# Patient Record
Sex: MALE | Race: BLACK OR AFRICAN AMERICAN | HISPANIC OR LATINO | Employment: FULL TIME | URBAN - METROPOLITAN AREA
[De-identification: names, ages, dates, MRNs, and addresses within clinical notes are randomized per-mention and may not be internally consistent; named-entity substitution may affect disease eponyms.]

---

## 2023-04-21 LAB
ALANINE AMINOTRANSFERASE (SGPT) (U/L) IN SER/PLAS: 14 U/L (ref 10–52)
ALBUMIN (G/DL) IN SER/PLAS: 4.2 G/DL (ref 3.4–5)
ALKALINE PHOSPHATASE (U/L) IN SER/PLAS: 73 U/L (ref 33–136)
ANION GAP IN SER/PLAS: 12 MMOL/L (ref 10–20)
ASPARTATE AMINOTRANSFERASE (SGOT) (U/L) IN SER/PLAS: 12 U/L (ref 9–39)
BASOPHILS (10*3/UL) IN BLOOD BY AUTOMATED COUNT: 0.03 X10E9/L (ref 0–0.1)
BASOPHILS/100 LEUKOCYTES IN BLOOD BY AUTOMATED COUNT: 0.7 % (ref 0–2)
BILIRUBIN TOTAL (MG/DL) IN SER/PLAS: 0.6 MG/DL (ref 0–1.2)
CALCIUM (MG/DL) IN SER/PLAS: 10.1 MG/DL (ref 8.6–10.6)
CARBON DIOXIDE, TOTAL (MMOL/L) IN SER/PLAS: 28 MMOL/L (ref 21–32)
CHLORIDE (MMOL/L) IN SER/PLAS: 105 MMOL/L (ref 98–107)
CHOLESTEROL (MG/DL) IN SER/PLAS: 164 MG/DL (ref 0–199)
CHOLESTEROL IN HDL (MG/DL) IN SER/PLAS: 71.9 MG/DL
CHOLESTEROL/HDL RATIO: 2.3
CREATININE (MG/DL) IN SER/PLAS: 0.91 MG/DL (ref 0.5–1.3)
EOSINOPHILS (10*3/UL) IN BLOOD BY AUTOMATED COUNT: 0.2 X10E9/L (ref 0–0.4)
EOSINOPHILS/100 LEUKOCYTES IN BLOOD BY AUTOMATED COUNT: 4.7 % (ref 0–6)
ERYTHROCYTE DISTRIBUTION WIDTH (RATIO) BY AUTOMATED COUNT: 14.8 % (ref 11.5–14.5)
ERYTHROCYTE MEAN CORPUSCULAR HEMOGLOBIN CONCENTRATION (G/DL) BY AUTOMATED: 31.6 G/DL (ref 32–36)
ERYTHROCYTE MEAN CORPUSCULAR VOLUME (FL) BY AUTOMATED COUNT: 89 FL (ref 80–100)
ERYTHROCYTES (10*6/UL) IN BLOOD BY AUTOMATED COUNT: 4.52 X10E12/L (ref 4.5–5.9)
GFR MALE: 88 ML/MIN/1.73M2
GLUCOSE (MG/DL) IN SER/PLAS: 93 MG/DL (ref 74–99)
HEMATOCRIT (%) IN BLOOD BY AUTOMATED COUNT: 40.2 % (ref 41–52)
HEMOGLOBIN (G/DL) IN BLOOD: 12.7 G/DL (ref 13.5–17.5)
IMMATURE GRANULOCYTES/100 LEUKOCYTES IN BLOOD BY AUTOMATED COUNT: 0.2 % (ref 0–0.9)
LDL: 82 MG/DL (ref 0–99)
LEUKOCYTES (10*3/UL) IN BLOOD BY AUTOMATED COUNT: 4.3 X10E9/L (ref 4.4–11.3)
LYMPHOCYTES (10*3/UL) IN BLOOD BY AUTOMATED COUNT: 1.24 X10E9/L (ref 0.8–3)
LYMPHOCYTES/100 LEUKOCYTES IN BLOOD BY AUTOMATED COUNT: 28.8 % (ref 13–44)
MONOCYTES (10*3/UL) IN BLOOD BY AUTOMATED COUNT: 0.37 X10E9/L (ref 0.05–0.8)
MONOCYTES/100 LEUKOCYTES IN BLOOD BY AUTOMATED COUNT: 8.6 % (ref 2–10)
NEUTROPHILS (10*3/UL) IN BLOOD BY AUTOMATED COUNT: 2.45 X10E9/L (ref 1.6–5.5)
NEUTROPHILS/100 LEUKOCYTES IN BLOOD BY AUTOMATED COUNT: 57 % (ref 40–80)
NRBC (PER 100 WBCS) BY AUTOMATED COUNT: 0 /100 WBC (ref 0–0)
PLATELETS (10*3/UL) IN BLOOD AUTOMATED COUNT: 298 X10E9/L (ref 150–450)
POTASSIUM (MMOL/L) IN SER/PLAS: 4.5 MMOL/L (ref 3.5–5.3)
PROTEIN TOTAL: 6.9 G/DL (ref 6.4–8.2)
SODIUM (MMOL/L) IN SER/PLAS: 140 MMOL/L (ref 136–145)
THYROTROPIN (MIU/L) IN SER/PLAS BY DETECTION LIMIT <= 0.05 MIU/L: 0.84 MIU/L (ref 0.44–3.98)
THYROXINE (T4) FREE (NG/DL) IN SER/PLAS: 1.29 NG/DL (ref 0.78–1.48)
TRIGLYCERIDE (MG/DL) IN SER/PLAS: 49 MG/DL (ref 0–149)
UREA NITROGEN (MG/DL) IN SER/PLAS: 18 MG/DL (ref 6–23)
VLDL: 10 MG/DL (ref 0–40)

## 2023-07-25 LAB
APPEARANCE, URINE: CLEAR
BILIRUBIN, URINE: NEGATIVE
BLOOD, URINE: NEGATIVE
COLOR, URINE: YELLOW
GLUCOSE, URINE: NEGATIVE MG/DL
KETONES, URINE: NEGATIVE MG/DL
LEUKOCYTE ESTERASE, URINE: NEGATIVE
NITRITE, URINE: NEGATIVE
PH, URINE: 5 (ref 5–8)
PROTEIN, URINE: NEGATIVE MG/DL
SPECIFIC GRAVITY, URINE: 1.02 (ref 1–1.03)
UROBILINOGEN, URINE: <2 MG/DL (ref 0–1.9)

## 2023-07-26 LAB — URINE CULTURE: NO GROWTH

## 2023-10-04 DIAGNOSIS — E78.00 ELEVATED LDL CHOLESTEROL LEVEL: ICD-10-CM

## 2023-10-04 DIAGNOSIS — M10.9 GOUT, ARTHROPATHY: Primary | ICD-10-CM

## 2023-10-04 PROBLEM — M17.12 PRIMARY OSTEOARTHRITIS OF LEFT KNEE: Status: ACTIVE | Noted: 2023-10-04

## 2023-10-04 PROBLEM — M51.16 LUMBAR DISC DISEASE WITH RADICULOPATHY: Status: ACTIVE | Noted: 2023-10-04

## 2023-10-04 PROBLEM — M51.369 DEGENERATIVE DISC DISEASE, LUMBAR: Status: ACTIVE | Noted: 2023-10-04

## 2023-10-04 PROBLEM — N62 GYNECOMASTIA, MALE: Status: ACTIVE | Noted: 2023-10-04

## 2023-10-04 PROBLEM — I10 BENIGN ESSENTIAL HYPERTENSION: Status: ACTIVE | Noted: 2023-10-04

## 2023-10-04 PROBLEM — E78.5 HYPERLIPIDEMIA: Status: ACTIVE | Noted: 2023-10-04

## 2023-10-04 PROBLEM — M51.36 DEGENERATIVE DISC DISEASE, LUMBAR: Status: ACTIVE | Noted: 2023-10-04

## 2023-10-04 PROBLEM — M25.462 EFFUSION, LEFT KNEE: Status: ACTIVE | Noted: 2023-10-04

## 2023-10-04 PROBLEM — E04.9 THYROID GOITER: Status: ACTIVE | Noted: 2023-10-04

## 2023-10-04 PROBLEM — M1A.0620 CHRONIC GOUT OF LEFT KNEE: Status: ACTIVE | Noted: 2023-10-04

## 2023-10-04 PROBLEM — E55.9 VITAMIN D DEFICIENCY: Status: ACTIVE | Noted: 2023-10-04

## 2023-10-04 PROBLEM — R00.1 BRADYCARDIA: Status: ACTIVE | Noted: 2023-10-04

## 2023-10-04 RX ORDER — FLUTICASONE PROPIONATE 50 MCG
SPRAY, SUSPENSION (ML) NASAL
COMMUNITY
Start: 2023-03-01

## 2023-10-04 RX ORDER — ATORVASTATIN CALCIUM 10 MG/1
10 TABLET, FILM COATED ORAL DAILY
Qty: 10 TABLET | Refills: 0 | Status: SHIPPED | OUTPATIENT
Start: 2023-10-04 | End: 2023-10-05 | Stop reason: SDUPTHER

## 2023-10-04 RX ORDER — ALLOPURINOL 100 MG/1
100 TABLET ORAL DAILY
Qty: 10 TABLET | Refills: 0 | Status: SHIPPED | OUTPATIENT
Start: 2023-10-04 | End: 2023-10-05 | Stop reason: SDUPTHER

## 2023-10-04 RX ORDER — ALLOPURINOL 100 MG/1
1 TABLET ORAL DAILY
COMMUNITY
Start: 2014-01-22 | End: 2023-10-04 | Stop reason: SDUPTHER

## 2023-10-04 RX ORDER — BETAMETHASONE DIPROPIONATE 0.5 MG/G
CREAM TOPICAL
COMMUNITY
Start: 2021-09-15

## 2023-10-04 RX ORDER — ATORVASTATIN CALCIUM 10 MG/1
1 TABLET, FILM COATED ORAL DAILY
COMMUNITY
Start: 2019-08-12 | End: 2023-10-04 | Stop reason: SDUPTHER

## 2023-10-05 ENCOUNTER — TELEPHONE (OUTPATIENT)
Dept: PRIMARY CARE | Facility: CLINIC | Age: 73
End: 2023-10-05
Payer: MEDICARE

## 2023-10-05 DIAGNOSIS — E78.00 ELEVATED LDL CHOLESTEROL LEVEL: ICD-10-CM

## 2023-10-05 DIAGNOSIS — M10.9 GOUT, ARTHROPATHY: ICD-10-CM

## 2023-10-05 RX ORDER — ATORVASTATIN CALCIUM 10 MG/1
10 TABLET, FILM COATED ORAL DAILY
Qty: 10 TABLET | Refills: 0 | Status: SHIPPED | OUTPATIENT
Start: 2023-10-05 | End: 2023-10-16

## 2023-10-05 RX ORDER — ALLOPURINOL 100 MG/1
100 TABLET ORAL DAILY
Qty: 10 TABLET | Refills: 0 | Status: SHIPPED | OUTPATIENT
Start: 2023-10-05 | End: 2023-10-20

## 2023-10-14 DIAGNOSIS — E78.00 ELEVATED LDL CHOLESTEROL LEVEL: ICD-10-CM

## 2023-10-16 RX ORDER — ATORVASTATIN CALCIUM 10 MG/1
10 TABLET, FILM COATED ORAL DAILY
Qty: 90 TABLET | Refills: 1 | Status: SHIPPED | OUTPATIENT
Start: 2023-10-16 | End: 2024-04-03 | Stop reason: SDUPTHER

## 2023-10-20 DIAGNOSIS — M10.9 GOUT, ARTHROPATHY: ICD-10-CM

## 2023-10-20 RX ORDER — ALLOPURINOL 100 MG/1
100 TABLET ORAL DAILY
Qty: 90 TABLET | Refills: 0 | Status: SHIPPED | OUTPATIENT
Start: 2023-10-20 | End: 2024-01-15

## 2023-10-23 PROBLEM — M54.16 LUMBAR BACK PAIN WITH RADICULOPATHY AFFECTING RIGHT LOWER EXTREMITY: Status: ACTIVE | Noted: 2023-10-23

## 2023-10-23 PROBLEM — M76.892 LEFT KNEE TENDONITIS: Status: ACTIVE | Noted: 2023-10-23

## 2023-10-23 PROBLEM — N63.0 MASS OF BREAST: Status: ACTIVE | Noted: 2023-10-23

## 2023-10-23 PROBLEM — L72.9 SKIN CYST: Status: ACTIVE | Noted: 2023-10-23

## 2023-10-23 PROBLEM — N31.9 NEUROGENIC BLADDER: Status: ACTIVE | Noted: 2023-10-23

## 2023-10-23 PROBLEM — M67.441 GANGLION CYST OF TENDON SHEATH OF RIGHT HAND: Status: ACTIVE | Noted: 2023-10-23

## 2023-10-23 PROBLEM — E04.2 MULTIPLE THYROID NODULES: Status: ACTIVE | Noted: 2023-10-23

## 2023-10-23 PROBLEM — L53.9 FOOT ERYTHEMA: Status: ACTIVE | Noted: 2023-10-23

## 2023-10-23 PROBLEM — C61: Status: ACTIVE | Noted: 2023-10-23

## 2023-10-23 PROBLEM — R31.9 HEMATURIA: Status: ACTIVE | Noted: 2023-10-23

## 2023-10-23 PROBLEM — A49.8 PROTEUS INFECTION: Status: ACTIVE | Noted: 2023-10-23

## 2023-10-23 PROBLEM — R06.83 SNORING: Status: ACTIVE | Noted: 2023-10-23

## 2023-10-23 PROBLEM — R00.1 PERSISTENT SINUS BRADYCARDIA: Status: ACTIVE | Noted: 2023-10-23

## 2023-10-23 RX ORDER — MUPIROCIN 20 MG/G
1 OINTMENT TOPICAL 3 TIMES DAILY
COMMUNITY
Start: 2022-12-15 | End: 2023-10-25 | Stop reason: ALTCHOICE

## 2023-10-23 RX ORDER — VALACYCLOVIR HYDROCHLORIDE 500 MG/1
500 TABLET, FILM COATED ORAL 2 TIMES DAILY
COMMUNITY

## 2023-10-25 ENCOUNTER — OFFICE VISIT (OUTPATIENT)
Dept: PRIMARY CARE | Facility: CLINIC | Age: 73
End: 2023-10-25
Payer: MEDICARE

## 2023-10-25 ENCOUNTER — LAB (OUTPATIENT)
Dept: LAB | Facility: LAB | Age: 73
End: 2023-10-25
Payer: MEDICARE

## 2023-10-25 VITALS
HEART RATE: 72 BPM | SYSTOLIC BLOOD PRESSURE: 130 MMHG | RESPIRATION RATE: 16 BRPM | BODY MASS INDEX: 28.08 KG/M2 | HEIGHT: 69 IN | WEIGHT: 189.6 LBS | DIASTOLIC BLOOD PRESSURE: 78 MMHG

## 2023-10-25 DIAGNOSIS — M1A.0620 IDIOPATHIC CHRONIC GOUT OF LEFT KNEE WITHOUT TOPHUS: ICD-10-CM

## 2023-10-25 DIAGNOSIS — M51.16 LUMBAR DISC DISEASE WITH RADICULOPATHY: ICD-10-CM

## 2023-10-25 DIAGNOSIS — M51.36 DEGENERATIVE DISC DISEASE, LUMBAR: ICD-10-CM

## 2023-10-25 DIAGNOSIS — I10 BENIGN ESSENTIAL HYPERTENSION: ICD-10-CM

## 2023-10-25 DIAGNOSIS — C61 PROSTATIC ADENOCARCINOMA (MULTI): ICD-10-CM

## 2023-10-25 DIAGNOSIS — Z00.00 MEDICARE ANNUAL WELLNESS VISIT, SUBSEQUENT: Primary | ICD-10-CM

## 2023-10-25 DIAGNOSIS — E78.00 ELEVATED LDL CHOLESTEROL LEVEL: ICD-10-CM

## 2023-10-25 DIAGNOSIS — E04.2 MULTIPLE THYROID NODULES: ICD-10-CM

## 2023-10-25 LAB
ALBUMIN SERPL BCP-MCNC: 4.5 G/DL (ref 3.4–5)
ALP SERPL-CCNC: 68 U/L (ref 33–136)
ALT SERPL W P-5'-P-CCNC: 19 U/L (ref 10–52)
ANION GAP SERPL CALC-SCNC: 11 MMOL/L (ref 10–20)
AST SERPL W P-5'-P-CCNC: 16 U/L (ref 9–39)
BASOPHILS # BLD AUTO: 0.02 X10*3/UL (ref 0–0.1)
BASOPHILS NFR BLD AUTO: 0.5 %
BILIRUB SERPL-MCNC: 0.8 MG/DL (ref 0–1.2)
BUN SERPL-MCNC: 18 MG/DL (ref 6–23)
CALCIUM SERPL-MCNC: 10.5 MG/DL (ref 8.6–10.6)
CHLORIDE SERPL-SCNC: 104 MMOL/L (ref 98–107)
CHOLEST SERPL-MCNC: 189 MG/DL (ref 0–199)
CHOLESTEROL/HDL RATIO: 2.8
CO2 SERPL-SCNC: 29 MMOL/L (ref 21–32)
CREAT SERPL-MCNC: 0.89 MG/DL (ref 0.5–1.3)
EOSINOPHIL # BLD AUTO: 0.27 X10*3/UL (ref 0–0.4)
EOSINOPHIL NFR BLD AUTO: 6.1 %
ERYTHROCYTE [DISTWIDTH] IN BLOOD BY AUTOMATED COUNT: 15.1 % (ref 11.5–14.5)
GFR SERPL CREATININE-BSD FRML MDRD: 90 ML/MIN/1.73M*2
GLUCOSE SERPL-MCNC: 96 MG/DL (ref 74–99)
HCT VFR BLD AUTO: 44.3 % (ref 41–52)
HDLC SERPL-MCNC: 67.6 MG/DL
HGB BLD-MCNC: 14.1 G/DL (ref 13.5–17.5)
IMM GRANULOCYTES # BLD AUTO: 0.01 X10*3/UL (ref 0–0.5)
IMM GRANULOCYTES NFR BLD AUTO: 0.2 % (ref 0–0.9)
LDLC SERPL CALC-MCNC: 106 MG/DL
LYMPHOCYTES # BLD AUTO: 1.6 X10*3/UL (ref 0.8–3)
LYMPHOCYTES NFR BLD AUTO: 36.1 %
MCH RBC QN AUTO: 28.6 PG (ref 26–34)
MCHC RBC AUTO-ENTMCNC: 31.8 G/DL (ref 32–36)
MCV RBC AUTO: 90 FL (ref 80–100)
MONOCYTES # BLD AUTO: 0.41 X10*3/UL (ref 0.05–0.8)
MONOCYTES NFR BLD AUTO: 9.3 %
NEUTROPHILS # BLD AUTO: 2.12 X10*3/UL (ref 1.6–5.5)
NEUTROPHILS NFR BLD AUTO: 47.8 %
NON HDL CHOLESTEROL: 121 MG/DL (ref 0–149)
NRBC BLD-RTO: 0 /100 WBCS (ref 0–0)
PLATELET # BLD AUTO: 298 X10*3/UL (ref 150–450)
PMV BLD AUTO: 10.4 FL (ref 7.5–11.5)
POTASSIUM SERPL-SCNC: 4.1 MMOL/L (ref 3.5–5.3)
PROT SERPL-MCNC: 7.7 G/DL (ref 6.4–8.2)
PSA SERPL-MCNC: <0.1 NG/ML
RBC # BLD AUTO: 4.93 X10*6/UL (ref 4.5–5.9)
SODIUM SERPL-SCNC: 140 MMOL/L (ref 136–145)
TRIGL SERPL-MCNC: 79 MG/DL (ref 0–149)
TSH SERPL-ACNC: 1.02 MIU/L (ref 0.44–3.98)
VLDL: 16 MG/DL (ref 0–40)
WBC # BLD AUTO: 4.4 X10*3/UL (ref 4.4–11.3)

## 2023-10-25 PROCEDURE — 99214 OFFICE O/P EST MOD 30 MIN: CPT | Performed by: INTERNAL MEDICINE

## 2023-10-25 PROCEDURE — 3078F DIAST BP <80 MM HG: CPT | Performed by: INTERNAL MEDICINE

## 2023-10-25 PROCEDURE — 84443 ASSAY THYROID STIM HORMONE: CPT

## 2023-10-25 PROCEDURE — 85025 COMPLETE CBC W/AUTO DIFF WBC: CPT

## 2023-10-25 PROCEDURE — 36415 COLL VENOUS BLD VENIPUNCTURE: CPT

## 2023-10-25 PROCEDURE — 1159F MED LIST DOCD IN RCRD: CPT | Performed by: INTERNAL MEDICINE

## 2023-10-25 PROCEDURE — 1125F AMNT PAIN NOTED PAIN PRSNT: CPT | Performed by: INTERNAL MEDICINE

## 2023-10-25 PROCEDURE — G0439 PPPS, SUBSEQ VISIT: HCPCS | Performed by: INTERNAL MEDICINE

## 2023-10-25 PROCEDURE — 80053 COMPREHEN METABOLIC PANEL: CPT

## 2023-10-25 PROCEDURE — 3075F SYST BP GE 130 - 139MM HG: CPT | Performed by: INTERNAL MEDICINE

## 2023-10-25 PROCEDURE — 84153 ASSAY OF PSA TOTAL: CPT

## 2023-10-25 PROCEDURE — 1036F TOBACCO NON-USER: CPT | Performed by: INTERNAL MEDICINE

## 2023-10-25 PROCEDURE — 80061 LIPID PANEL: CPT

## 2023-10-25 RX ORDER — BICALUTAMIDE 50 MG/1
TABLET, FILM COATED ORAL
COMMUNITY
Start: 2020-01-08

## 2023-10-25 ASSESSMENT — ENCOUNTER SYMPTOMS
HEMATOLOGIC/LYMPHATIC NEGATIVE: 1
LOSS OF SENSATION IN FEET: 0
ENDOCRINE NEGATIVE: 1
ALLERGIC/IMMUNOLOGIC NEGATIVE: 1
EYES NEGATIVE: 1
NEUROLOGICAL NEGATIVE: 1
MUSCULOSKELETAL NEGATIVE: 1
DEPRESSION: 0
CONSTITUTIONAL NEGATIVE: 1
PSYCHIATRIC NEGATIVE: 1
GASTROINTESTINAL NEGATIVE: 1
OCCASIONAL FEELINGS OF UNSTEADINESS: 0
CARDIOVASCULAR NEGATIVE: 1
RESPIRATORY NEGATIVE: 1

## 2023-10-25 NOTE — ASSESSMENT & PLAN NOTE
HTN - hypertension well/controlled .Target BP < 130/80  achieved. Educate low salt diet and exercise with weight loss. Educate home self monitoring and diary keeping. Educate risks of elevate blood pressure and benefits of prompt treatment.

## 2023-10-25 NOTE — ASSESSMENT & PLAN NOTE
Chronic  gout into the right knee joint  - with pain and swelling and redness - educate extensively diet and  start Colchicine 0.6 mg daily and Allopurinol 300 mg daily  -= and pain control with Tylenol; 325 mg TID  PRN for 10 days # 30  -

## 2023-10-25 NOTE — PROGRESS NOTES
"Subjective   Patient ID: Gee Hardin is a 73 y.o. male who presents for Medicare Annual Wellness Visit Initial (MEDICARE WELLNESS).    HPI     Review of Systems   Constitutional: Negative.    HENT: Negative.     Eyes: Negative.    Respiratory: Negative.     Cardiovascular: Negative.    Gastrointestinal: Negative.    Endocrine: Negative.    Musculoskeletal: Negative.    Skin: Negative.    Allergic/Immunologic: Negative.    Neurological: Negative.    Hematological: Negative.    Psychiatric/Behavioral: Negative.     All other systems reviewed and are negative.      Objective   Ht 1.753 m (5' 9\")   Wt 86 kg (189 lb 9.5 oz)   BMI 28.00 kg/m²   Height 1.753 m (5' 9\"), weight 86 kg (189 lb 9.5 oz).   Physical Exam  Vitals and nursing note reviewed.   Constitutional:       Appearance: Normal appearance.   HENT:      Head: Normocephalic and atraumatic.      Right Ear: Tympanic membrane, ear canal and external ear normal.      Left Ear: Tympanic membrane, ear canal and external ear normal. There is no impacted cerumen.      Nose: Nose normal.      Mouth/Throat:      Mouth: Mucous membranes are moist.      Pharynx: Oropharynx is clear.   Eyes:      Extraocular Movements: Extraocular movements intact.      Conjunctiva/sclera: Conjunctivae normal.      Pupils: Pupils are equal, round, and reactive to light.   Cardiovascular:      Rate and Rhythm: Normal rate and regular rhythm.      Pulses: Normal pulses.      Heart sounds: Normal heart sounds. No murmur heard.  Pulmonary:      Effort: Pulmonary effort is normal. No respiratory distress.      Breath sounds: Normal breath sounds. No stridor. No wheezing, rhonchi or rales.   Chest:      Chest wall: No tenderness.   Abdominal:      General: Abdomen is flat. Bowel sounds are normal. There is no distension.      Palpations: Abdomen is soft. There is no mass.      Tenderness: There is no abdominal tenderness. There is no right CVA tenderness, left CVA tenderness, guarding or " rebound.      Hernia: No hernia is present.   Musculoskeletal:         General: Normal range of motion.      Cervical back: Normal range of motion and neck supple.   Skin:     General: Skin is warm.      Capillary Refill: Capillary refill takes less than 2 seconds.   Neurological:      General: No focal deficit present.      Mental Status: He is alert.      Cranial Nerves: No cranial nerve deficit.      Sensory: No sensory deficit.      Motor: No weakness.      Coordination: Coordination normal.      Gait: Gait normal.      Deep Tendon Reflexes: Reflexes normal.   Psychiatric:         Mood and Affect: Mood normal.         Behavior: Behavior normal. Behavior is cooperative.         Thought Content: Thought content normal.         Judgment: Judgment normal.         Assessment/Plan   Problem List Items Addressed This Visit             ICD-10-CM    Benign essential hypertension I10     HTN - hypertension well/controlled .Target BP < 130/80  achieved. Educate low salt diet and exercise with weight loss. Educate home self monitoring and diary keeping. Educate risks of elevate blood pressure and benefits of prompt treatment.             Relevant Orders    Comprehensive Metabolic Panel    Degenerative disc disease, lumbar M51.36     DDD - Degenerative disc disease of the Lumbo-Sacral (LS)/Cervical (C)  spine. Educate exercises and referred patient to Physical Therapy (PT). Ordered X-Ray's of the LS/C spine. Consider MRI. Radiculopathy in the distribution of L4-L5-S1/C3-C4-C5 nerve roots, needs NSAIDS (Naprosin 500mg BID vs. Arthrotec 75mg BID or Prednisone taper (Medrol dose pack), Flexeril 10 mg qHS, heat application, and pain control with Tylenol vs. Vicodin 5/325 mg TID.            Chronic gout of left knee [id number].0620     Chronic  gout into the right knee joint  - with pain and swelling and redness - educate extensively diet and  start Colchicine 0.6 mg daily and Allopurinol 300 mg daily  -= and pain control with Tylenol;  325 mg TID  PRN for 10 days # 30  -           Elevated LDL cholesterol level E78.00     Hypercholesterolemia - Monitor lipid profile and educate patient upon risks of high cholesterol and targets. Educate diet and change in lifestyle and increase in exercises - Refill: Atorvastatin  and educate compliance with medication and diet.           Relevant Orders    Lipid Panel    Multiple thyroid nodules E04.2     .goiter and thyroid nodule and check US and function          Relevant Orders    CBC and Auto Differential    TSH with reflex to Free T4 if abnormal    Prostatic adenocarcinoma (CMS/HCC) C61     Monitor PSA and Asymptomatic   - and feels well and on Casodex         Relevant Orders    Prostate Specific Antigen    Medicare annual wellness visit, subsequent - Primary Z00.00     Medicare wellness-   No recent hospitalizations.    All medications reviewed and reconciled by me the provider..  No use of controlled substances or opiates.    Family history, social history reviewed, no changes.    Patient does not smoke.    Patient does not drink.    Patient hydrates adequately daily.  Eats a well-balanced healthy diet.     Exercises adequately including walking and doing weightbearing exercises.    Patient denies any difficulty with memory or cognition.     Denies any difficulty with hearing.  Patient does not wear hearing aids.    No fall risk.  No recent falls.  Denies any difficulty walking.    Patient with no history of depression anxiety, denies any loss of interest, no feeling of sadness, no lack of motivation.    Patient is independent in all ADLs and IADLs.  Independent bathing, dressing, walking.  Takes care of own finances, shopping and cooking.     End-of-life decision-making power of  reviewed with patient.     Preventive measures - Recommend ASAP : Refer to Gi for  Colonoscopy (educate patient risks of colon cancer) refer patient to GI specialist. Ophthalmology and retina exam recommend yearly exams  refer patient to an Ophthalmologist. Prostate cancer and followed with  Urologist for rectal exam and PSA check.     COVID-19, mRNA, LNP-S, PF, 100 mcg/0.5mL dose or 50 mcg/0.25mL dose (Moderna SARS-CoV-2 Vaccination)2/12/2022, 8/15/2021, 3/4/2021, 2/4/2021    COVID-19, mRNA, LNP-S, PF, 50 mcg/0.5 mL (Moderna COVID-19 vaccine, Fall 2023, 12 yeasrs and older (50mcg/0.5mL))9/30/2023    COVID-19, mRNA, LNP-S, bivalent, PF, 50 mcg/0.5 mL or 25mcg/0.25 mL dose (Moderna COVID-19 vaccine, bivalent, blue cap/gray label *Check age/dose*)9/18/2022    Influenza vaccine, quadrivalent, adjuvanted (Influenza, Seasonal, Quadrivalent, Adjuvanted)8/31/2023    Influenza, seasonal, injectable9/22/2016, 9/17/2015    Pneumococcal conjugate PCV 13 (Pneumococcal conjugate vaccine, 13-valent (PREVNAR 13))9/17/2015    Pneumococcal conjugate PCV20, polysaccharide [id number] conjugate, adjuvant, PF (Pneumococcal conjugate vaccine, 20-valent (PREVNAR 20))8/31/2023    pneumococcal polysaccharide PPV23 (Pneumococcal polysaccharide vaccine, 23-valent, age 2 years and older (PNEUMOVAX 23))5/9/2021    zoster live (Zoster, live)9/14/2011    zoster recombinant (Zoster vaccine, recombinant, adult (SHINGRIX))5/9/2021, 3/5/2019      Forecasted immunizations as of today at 8:57 AM     suggestion  The following recommendations are calculated by the immunization registry and might not match your organization's recommended schedule.     VaccineRecommended Due Date Next Dose #     TDAP (Tdap vaccine, age 7 year and older (BOOSTRIX))1/5/1957 1    HepB (Hep B, Unspecified)1/1/1986 1    HepA (Hep A, Unspecified)1/1/1996 1    FLU (Influenza, Unspecified)7/1/2024

## 2023-10-25 NOTE — ASSESSMENT & PLAN NOTE
DDD - Degenerative disc disease of the Lumbo-Sacral (LS)/Cervical (C)  spine. Educate exercises and referred patient to Physical Therapy (PT). Ordered X-Ray's of the LS/C spine. Consider MRI. Radiculopathy in the distribution of L4-L5-S1/C3-C4-C5 nerve roots, needs NSAIDS (Naprosin 500mg BID vs. Arthrotec 75mg BID or Prednisone taper (Medrol dose pack), Flexeril 10 mg qHS, heat application, and pain control with Tylenol vs. Vicodin 5/325 mg TID.

## 2023-10-25 NOTE — ASSESSMENT & PLAN NOTE
Medicare wellness-   No recent hospitalizations.    All medications reviewed and reconciled by me the provider..  No use of controlled substances or opiates.    Family history, social history reviewed, no changes.    Patient does not smoke.    Patient does not drink.    Patient hydrates adequately daily.  Eats a well-balanced healthy diet.     Exercises adequately including walking and doing weightbearing exercises.    Patient denies any difficulty with memory or cognition.     Denies any difficulty with hearing.  Patient does not wear hearing aids.    No fall risk.  No recent falls.  Denies any difficulty walking.    Patient with no history of depression anxiety, denies any loss of interest, no feeling of sadness, no lack of motivation.    Patient is independent in all ADLs and IADLs.  Independent bathing, dressing, walking.  Takes care of own finances, shopping and cooking.     End-of-life decision-making power of  reviewed with patient.     Preventive measures - Recommend ASAP : Refer to Gi for  Colonoscopy (educate patient risks of colon cancer) refer patient to GI specialist. Ophthalmology and retina exam recommend yearly exams refer patient to an Ophthalmologist. Prostate cancer and followed with  Urologist for rectal exam and PSA check.     COVID-19, mRNA, LNP-S, PF, 100 mcg/0.5mL dose or 50 mcg/0.25mL dose (Moderna SARS-CoV-2 Vaccination)2/12/2022, 8/15/2021, 3/4/2021, 2/4/2021    COVID-19, mRNA, LNP-S, PF, 50 mcg/0.5 mL (Moderna COVID-19 vaccine, Fall 2023, 12 yeasrs and older (50mcg/0.5mL))9/30/2023    COVID-19, mRNA, LNP-S, bivalent, PF, 50 mcg/0.5 mL or 25mcg/0.25 mL dose (Moderna COVID-19 vaccine, bivalent, blue cap/gray label *Check age/dose*)9/18/2022    Influenza vaccine, quadrivalent, adjuvanted (Influenza, Seasonal, Quadrivalent, Adjuvanted)8/31/2023    Influenza, seasonal, injectable9/22/2016, 9/17/2015    Pneumococcal conjugate PCV 13 (Pneumococcal conjugate vaccine, 13-valent (PREVNAR  13))9/17/2015    Pneumococcal conjugate PCV20, polysaccharide [id number] conjugate, adjuvant, PF (Pneumococcal conjugate vaccine, 20-valent (PREVNAR 20))8/31/2023    pneumococcal polysaccharide PPV23 (Pneumococcal polysaccharide vaccine, 23-valent, age 2 years and older (PNEUMOVAX 23))5/9/2021    zoster live (Zoster, live)9/14/2011    zoster recombinant (Zoster vaccine, recombinant, adult (SHINGRIX))5/9/2021, 3/5/2019      Forecasted immunizations as of today at 8:57 AM     suggestion  The following recommendations are calculated by the immunization registry and might not match your organization's recommended schedule.     VaccineRecommended Due Date Next Dose #     TDAP (Tdap vaccine, age 7 year and older (BOOSTRIX))1[birth year]1956 1    HepB (Hep B, Unspecified)1/1/1986 1    HepA (Hep A, Unspecified)1/1/1996 1    FLU (Influenza, Unspecified)7/1/2024

## 2024-01-15 DIAGNOSIS — M10.9 GOUT, ARTHROPATHY: ICD-10-CM

## 2024-01-15 RX ORDER — ALLOPURINOL 100 MG/1
100 TABLET ORAL DAILY
Qty: 90 TABLET | Refills: 0 | Status: SHIPPED | OUTPATIENT
Start: 2024-01-15 | End: 2024-04-03 | Stop reason: SDUPTHER

## 2024-01-22 ENCOUNTER — OFFICE VISIT (OUTPATIENT)
Dept: PRIMARY CARE | Facility: CLINIC | Age: 74
End: 2024-01-22
Payer: MEDICARE

## 2024-01-22 VITALS
WEIGHT: 189 LBS | HEIGHT: 69 IN | HEART RATE: 78 BPM | SYSTOLIC BLOOD PRESSURE: 128 MMHG | DIASTOLIC BLOOD PRESSURE: 70 MMHG | RESPIRATION RATE: 16 BRPM | BODY MASS INDEX: 27.99 KG/M2

## 2024-01-22 DIAGNOSIS — Z12.11 COLON CANCER SCREENING: ICD-10-CM

## 2024-01-22 DIAGNOSIS — I10 BENIGN ESSENTIAL HYPERTENSION: ICD-10-CM

## 2024-01-22 DIAGNOSIS — M1A.0790 CHRONIC GOUT OF FOOT, UNSPECIFIED CAUSE, UNSPECIFIED LATERALITY: ICD-10-CM

## 2024-01-22 DIAGNOSIS — D64.9 ANEMIA, UNSPECIFIED TYPE: ICD-10-CM

## 2024-01-22 DIAGNOSIS — E78.00 ELEVATED LDL CHOLESTEROL LEVEL: ICD-10-CM

## 2024-01-22 DIAGNOSIS — N31.9 NEUROGENIC BLADDER: ICD-10-CM

## 2024-01-22 DIAGNOSIS — Z00.00 MEDICARE ANNUAL WELLNESS VISIT, SUBSEQUENT: Primary | ICD-10-CM

## 2024-01-22 DIAGNOSIS — C61 PROSTATE CANCER (MULTI): ICD-10-CM

## 2024-01-22 DIAGNOSIS — E04.2 MULTIPLE THYROID NODULES: ICD-10-CM

## 2024-01-22 DIAGNOSIS — M51.16 LUMBAR DISC DISEASE WITH RADICULOPATHY: ICD-10-CM

## 2024-01-22 PROCEDURE — 1159F MED LIST DOCD IN RCRD: CPT | Performed by: INTERNAL MEDICINE

## 2024-01-22 PROCEDURE — 1036F TOBACCO NON-USER: CPT | Performed by: INTERNAL MEDICINE

## 2024-01-22 PROCEDURE — 3078F DIAST BP <80 MM HG: CPT | Performed by: INTERNAL MEDICINE

## 2024-01-22 PROCEDURE — 3074F SYST BP LT 130 MM HG: CPT | Performed by: INTERNAL MEDICINE

## 2024-01-22 PROCEDURE — 99214 OFFICE O/P EST MOD 30 MIN: CPT | Performed by: INTERNAL MEDICINE

## 2024-01-22 PROCEDURE — 1125F AMNT PAIN NOTED PAIN PRSNT: CPT | Performed by: INTERNAL MEDICINE

## 2024-01-22 RX ORDER — TRIAMCINOLONE ACETONIDE 1 MG/G
CREAM TOPICAL
COMMUNITY
Start: 2023-10-26

## 2024-01-22 ASSESSMENT — ENCOUNTER SYMPTOMS
EYES NEGATIVE: 1
MUSCULOSKELETAL NEGATIVE: 1
CONSTITUTIONAL NEGATIVE: 1
ENDOCRINE NEGATIVE: 1
RESPIRATORY NEGATIVE: 1
ALLERGIC/IMMUNOLOGIC NEGATIVE: 1
NEUROLOGICAL NEGATIVE: 1
PSYCHIATRIC NEGATIVE: 1
HEMATOLOGIC/LYMPHATIC NEGATIVE: 1
CARDIOVASCULAR NEGATIVE: 1
GASTROINTESTINAL NEGATIVE: 1

## 2024-01-22 NOTE — ASSESSMENT & PLAN NOTE
DDD - Degenerative disc disease of the Lumbo-Sacral (LS)/spine. Educate exercises and referred patient to Physical Therapy (PT). Ordered X-Ray's of the LS/ spine. Consider MRI. Radiculopathy in the distribution of L4-L5-S1/ nerve roots, needs NSAIDS (Naprosin 500mg BID vs. Arthrotec 75mg BID or Prednisone taper (Medrol dose pack), Flexeril 10 mg qHS, heat application, and pain control with Tylenol

## 2024-01-22 NOTE — PROGRESS NOTES
"Subjective   Patient ID: Gee Hardin is a 74 y.o. male who presents for Medicare Annual Wellness Visit Initial (MCR).    HPI     Review of Systems   Constitutional: Negative.    HENT: Negative.     Eyes: Negative.    Respiratory: Negative.     Cardiovascular: Negative.    Gastrointestinal: Negative.    Endocrine: Negative.    Musculoskeletal: Negative.    Skin: Negative.    Allergic/Immunologic: Negative.    Neurological: Negative.    Hematological: Negative.    Psychiatric/Behavioral: Negative.     All other systems reviewed and are negative.      Objective   Ht 1.753 m (5' 9\")   Wt 85.7 kg (189 lb)   BMI 27.91 kg/m²   Blood pressure 128/70, pulse 78, resp. rate 16, height 1.753 m (5' 9\"), weight 85.7 kg (189 lb).   Physical Exam  Vitals and nursing note reviewed.   Constitutional:       Appearance: Normal appearance.   HENT:      Head: Normocephalic and atraumatic.      Right Ear: Tympanic membrane, ear canal and external ear normal.      Left Ear: Tympanic membrane, ear canal and external ear normal. There is no impacted cerumen.      Nose: Nose normal.      Mouth/Throat:      Mouth: Mucous membranes are moist.      Pharynx: Oropharynx is clear.   Eyes:      Extraocular Movements: Extraocular movements intact.      Conjunctiva/sclera: Conjunctivae normal.      Pupils: Pupils are equal, round, and reactive to light.   Cardiovascular:      Rate and Rhythm: Normal rate and regular rhythm.      Pulses: Normal pulses.      Heart sounds: Normal heart sounds. No murmur heard.  Pulmonary:      Effort: Pulmonary effort is normal. No respiratory distress.      Breath sounds: Normal breath sounds. No stridor. No wheezing, rhonchi or rales.   Chest:      Chest wall: No tenderness.   Abdominal:      General: Abdomen is flat. Bowel sounds are normal. There is no distension.      Palpations: Abdomen is soft. There is no mass.      Tenderness: There is no abdominal tenderness. There is no right CVA tenderness, left CVA " tenderness, guarding or rebound.      Hernia: No hernia is present.   Musculoskeletal:         General: Normal range of motion.      Cervical back: Normal range of motion and neck supple.   Skin:     General: Skin is warm.      Capillary Refill: Capillary refill takes less than 2 seconds.   Neurological:      General: No focal deficit present.      Mental Status: He is alert.      Cranial Nerves: No cranial nerve deficit.      Sensory: No sensory deficit.      Motor: No weakness.      Coordination: Coordination normal.      Gait: Gait normal.      Deep Tendon Reflexes: Reflexes normal.   Psychiatric:         Mood and Affect: Mood normal.         Behavior: Behavior normal. Behavior is cooperative.         Thought Content: Thought content normal.         Judgment: Judgment normal.         Assessment/Plan   Problem List Items Addressed This Visit             ICD-10-CM    Benign essential hypertension I10     HTN - hypertension well/controlled .Target BP < 130/80  achieved. Educate low salt diet and exercise with weight loss. Educate home self monitoring and diary keeping. Educate risks of elevate blood pressure and benefits of prompt treatment.  Refill          Relevant Orders    Comprehensive Metabolic Panel    Elevated LDL cholesterol level E78.00     Hypercholesterolemia - Monitor lipid profile and educate patient upon risks of high cholesterol and targets. Educate diet and change in lifestyle and increase in exercises - Refill: Atorvastatin  and educate compliance with medication and diet.            Relevant Orders    Lipid Panel    Lumbar disc disease with radiculopathy M51.16     DDD - Degenerative disc disease of the Lumbo-Sacral (LS)/spine. Educate exercises and referred patient to Physical Therapy (PT). Ordered X-Ray's of the LS/ spine. Consider MRI. Radiculopathy in the distribution of L4-L5-S1/ nerve roots, needs NSAIDS (Naprosin 500mg BID vs. Arthrotec 75mg BID or Prednisone taper (Medrol dose pack),  Flexeril 10 mg qHS, heat application, and pain control with Tylenol           Neurogenic bladder N31.9    Multiple thyroid nodules E04.2     .goiter and thyroid nodule and check US and function  and follows with surgery           Relevant Orders    TSH with reflex to Free T4 if abnormal    Medicare annual wellness visit, subsequent - Primary Z00.00     No recent hospitalizations.    All medications reviewed and reconciled by me the provider..  No use of controlled substances or opiates.    Family history, social history reviewed, no changes.    Patient does not smoke.    Patient does not drink.    Patient hydrates adequately daily.  Eats a well-balanced healthy diet.     Exercises adequately including walking and doing weightbearing exercises.    Patient denies any difficulty with memory or cognition.     Denies any difficulty with hearing.  Patient does not wear hearing aids.    No fall risk.  No recent falls.  Denies any difficulty walking.    Patient with no history of depression anxiety, denies any loss of interest, no feeling of sadness, no lack of motivation.    Patient is independent in all ADLs and IADLs.  Independent bathing, dressing, walking.  Takes care of own finances, shopping and cooking.     End-of-life decision-making power of  reviewed with patient.   Risk Factors Identified During Visit: None.   Influenza: influenza vaccine was previously given.   Pneumovax 23: Pneumovax 23 vaccine was previously given.   Prevnar 13: Prevnar 13 vaccine was previously given.   Shingles Vaccine: Shingles vaccine was previously given.   Prostate cancer screening: Screening is current.   Colorectal Cancer Screening: screening is current.   Abdominal Aortic Aneurysm screening: screening is current.   HIV screening: screening not indicated.       Preventive measures - Recommend ASAP : . Colonoscopy (educate patient risks of colon cancer) refer patient to GI specialist. Ophthalmology and retina exam recommend  yearly exams refer patient to an Ophthalmologist. BPH - (Benign Prostatic Hypertrophy) refer patient to an Urologist for rectal exam and PSA check.          Relevant Orders    CBC and Auto Differential     Other Visit Diagnoses         Codes    Colon cancer screening     Z12.11    Relevant Orders    Cologuard® colon cancer screening    Prostate cancer (CMS/HCC)     C61    Relevant Orders    Prostate Specific Antigen    Anemia, unspecified type     D64.9    Relevant Orders    CBC and Auto Differential              Benign essential hypertension I10        HTN - hypertension well/controlled .Target BP < 130/80  achieved. Educate low salt diet and exercise with weight loss. Educate home self monitoring and diary keeping. Educate risks of elevate blood pressure and benefits of prompt treatment.                Relevant Orders     Comprehensive Metabolic Panel     Degenerative disc disease, lumbar M51.36       DDD - Degenerative disc disease of the Lumbo-Sacral (LS)/Cervical (C)  spine. Educate exercises and referred patient to Physical Therapy (PT). Ordered X-Ray's of the LS/C spine. Consider MRI. Radiculopathy in the distribution of L4-L5-S1/C3-C4-C5 nerve roots, needs NSAIDS (Naprosin 500mg BID vs. Arthrotec 75mg BID or Prednisone taper (Medrol dose pack), Flexeril 10 mg qHS, heat application, and pain control with Tylenol vs. Vicodin 5/325 mg TID.               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 Educate diet and change in lifestyle and increase in exercises - Refill: Atorvastatin  and educate compliance with  medication and diet.             Relevant Orders     Lipid Panel     Multiple thyroid nodules E04.2       .goiter and thyroid nodule and check US and function            Relevant Orders     CBC and Auto Differential     TSH with reflex to Free T4 if abnormal     Prostatic adenocarcinoma (CMS/[facility]) C61       Monitor PSA and Asymptomatic   - and feels well and on Casodex           Relevant Orders     Prostate Specific Antigen     Medicare annual wellness visit, subsequent - Primary Z00.00       Medicare wellness-   No recent hospitalizations.     All medications reviewed and reconciled by me the provider..  No use of controlled substances or opiates.     Family history, social history reviewed, no changes.     Patient does not smoke.     Patient does not drink.     Patient hydrates adequately daily.  Eats a well-balanced healthy diet.      Exercises adequately including walking and doing weightbearing exercises.     Patient denies any difficulty with memory or cognition.      Denies any difficulty with hearing.  Patient does not wear hearing aids.     No fall risk.  No recent falls.  Denies any difficulty walking.     Patient with no history of depression anxiety, denies any loss of interest, no feeling of sadness, no lack of motivation.     Patient is independent in all ADLs and IADLs.  Independent bathing, dressing, walking.  Takes care of own finances, shopping and cooking.      End-of-life decision-making power of  reviewed with patient.      Preventive measures - Recommend ASAP : Refer to Gi for  Colonoscopy (educate patient risks of colon cancer) refer patient to GI specialist. Ophthalmology and retina exam recommend yearly exams refer patient to an Ophthalmologist. Prostate cancer and followed with  Urologist for rectal exam and PSA check.      COVID-19, mRNA, LNP-S, PF, 100 mcg/0.5mL dose or 50 mcg/0.25mL dose (Moderna SARS-CoV-2 Vaccination)2/12/2022, 8/15/2021, 3/4/2021, 2/4/2021    COVID-19, mRNA,  LNP-S, PF, 50 mcg/0.5 mL (Moderna COVID-19 vaccine, Fall 2023, 12 yeasrs and older (50mcg/0.5mL))9/30/2023    COVID-19, mRNA, LNP-S, bivalent, PF, 50 mcg/0.5 mL or 25mcg/0.25 mL dose (Moderna COVID-19 vaccine, bivalent, blue cap/gray label *Check age/dose*)9/18/2022    Influenza vaccine, quadrivalent, adjuvanted (Influenza, Seasonal, Quadrivalent, Adjuvanted)8/31/2023    Influenza, seasonal, injectable9/22/2016, 9/17/2015    Pneumococcal conjugate PCV 13 (Pneumococcal conjugate vaccine, 13-valent (PREVNAR 13))9/17/2015    Pneumococcal conjugate PCV20, polysaccharide [id number] conjugate, adjuvant, PF (Pneumococcal conjugate vaccine, 20-valent (PREVNAR 20))8/31/2023    pneumococcal polysaccharide PPV23 (Pneumococcal polysaccharide vaccine, 23-valent, age 2 years and older (PNEUMOVAX 23))5/9/2021    zoster live (Zoster, live)9/14/2011    zoster recombinant (Zoster vaccine, recombinant, adult (SHINGRIX))5/9/2021, 3/5/2019      Forecasted immunizations as of today at 8:57 AM      suggestion  The following recommendations are calculated by the immunization registry and might not match your organization's recommended schedule.      VaccineRecommended Due Date Next Dose #      TDAP (Tdap vaccine, age 7 year and older (BOOSTRIX))1/5/1957 1    HepB (Hep B, Unspecified)1/1/1986 1    HepA (Hep A, Unspecified)1/1/1996 1    FLU (Influenza, Unspecified)7/1/2024                                 Immunizations/Injections      very important  Immunizations from outside sources need reconciliation.      Influenza, High Dose Seasonal, Preservative Free10/10/2022, 10/8/2021, 10/5/2020,  ... (5 more)  Influenza, Seasonal, Quadrivalent, Adjuvanted8/31/2023  Influenza, Oytaqlmjtve92/10/2022, 10/8/2021, 11/19/2010  Influenza, seasonal, edzyesoask13/4/2013, 10/30/2012  Moderna COVID-19 vaccine, Fall 2023, 12 yeasrs and older (50mcg/0.5mL)9/30/2023  Moderna COVID-19 vaccine, bivalent, blue cap/gray label *Check age/dose*9/18/2022  Moderna  SARS-CoV-2 Vaccination2/12/2022, 8/15/2021, 3/4/2021,  ... (1 more)  Pneumococcal conjugate vaccine, 13-valent (PREVNAR 13)9/17/2015  Pneumococcal conjugate vaccine, 20-valent (PREVNAR 20)8/31/2023  Pneumococcal polysaccharide vaccine, 23-valent, age 2 years and older (PNEUMOVAX 23)5/9/2021, 12/22/2014  Td vaccine, age 7 years and older (TDVAX)8/21/2009  Zoster vaccine, recombinant, adult (SHINGRIX)5/9/2021, 3/5/2019  Zoster, live9/14/2011

## 2024-01-22 NOTE — ASSESSMENT & PLAN NOTE
No recent hospitalizations.    All medications reviewed and reconciled by me the provider..  No use of controlled substances or opiates.    Family history, social history reviewed, no changes.    Patient does not smoke.    Patient does not drink.    Patient hydrates adequately daily.  Eats a well-balanced healthy diet.     Exercises adequately including walking and doing weightbearing exercises.    Patient denies any difficulty with memory or cognition.     Denies any difficulty with hearing.  Patient does not wear hearing aids.    No fall risk.  No recent falls.  Denies any difficulty walking.    Patient with no history of depression anxiety, denies any loss of interest, no feeling of sadness, no lack of motivation.    Patient is independent in all ADLs and IADLs.  Independent bathing, dressing, walking.  Takes care of own finances, shopping and cooking.     End-of-life decision-making power of  reviewed with patient.   Risk Factors Identified During Visit: None.   Influenza: influenza vaccine was previously given.   Pneumovax 23: Pneumovax 23 vaccine was previously given.   Prevnar 13: Prevnar 13 vaccine was previously given.   Shingles Vaccine: Shingles vaccine was previously given.   Prostate cancer screening: Screening is current.   Colorectal Cancer Screening: screening is current.   Abdominal Aortic Aneurysm screening: screening is current.   HIV screening: screening not indicated.       Preventive measures - Recommend ASAP : . Colonoscopy (educate patient risks of colon cancer) refer patient to GI specialist. Ophthalmology and retina exam recommend yearly exams refer patient to an Ophthalmologist. BPH - (Benign Prostatic Hypertrophy) refer patient to an Urologist for rectal exam and PSA check.

## 2024-01-23 ENCOUNTER — LAB (OUTPATIENT)
Dept: LAB | Facility: LAB | Age: 74
End: 2024-01-23
Payer: MEDICARE

## 2024-01-23 DIAGNOSIS — E78.00 ELEVATED LDL CHOLESTEROL LEVEL: ICD-10-CM

## 2024-01-23 DIAGNOSIS — M1A.0790 CHRONIC GOUT OF FOOT, UNSPECIFIED CAUSE, UNSPECIFIED LATERALITY: ICD-10-CM

## 2024-01-23 DIAGNOSIS — I10 BENIGN ESSENTIAL HYPERTENSION: ICD-10-CM

## 2024-01-23 DIAGNOSIS — C61 PROSTATE CANCER (MULTI): ICD-10-CM

## 2024-01-23 DIAGNOSIS — D64.9 ANEMIA, UNSPECIFIED TYPE: ICD-10-CM

## 2024-01-23 DIAGNOSIS — Z00.00 MEDICARE ANNUAL WELLNESS VISIT, SUBSEQUENT: ICD-10-CM

## 2024-01-23 DIAGNOSIS — E04.2 MULTIPLE THYROID NODULES: ICD-10-CM

## 2024-01-23 LAB
ALBUMIN SERPL BCP-MCNC: 4.4 G/DL (ref 3.4–5)
ALP SERPL-CCNC: 70 U/L (ref 33–136)
ALT SERPL W P-5'-P-CCNC: 20 U/L (ref 10–52)
ANION GAP SERPL CALC-SCNC: 13 MMOL/L (ref 10–20)
AST SERPL W P-5'-P-CCNC: 18 U/L (ref 9–39)
BASOPHILS # BLD AUTO: 0.02 X10*3/UL (ref 0–0.1)
BASOPHILS NFR BLD AUTO: 0.5 %
BILIRUB SERPL-MCNC: 0.6 MG/DL (ref 0–1.2)
BUN SERPL-MCNC: 16 MG/DL (ref 6–23)
CALCIUM SERPL-MCNC: 10 MG/DL (ref 8.6–10.6)
CHLORIDE SERPL-SCNC: 104 MMOL/L (ref 98–107)
CHOLEST SERPL-MCNC: 153 MG/DL (ref 0–199)
CHOLESTEROL/HDL RATIO: 2.4
CO2 SERPL-SCNC: 27 MMOL/L (ref 21–32)
CREAT SERPL-MCNC: 0.78 MG/DL (ref 0.5–1.3)
EGFRCR SERPLBLD CKD-EPI 2021: >90 ML/MIN/1.73M*2
EOSINOPHIL # BLD AUTO: 0.19 X10*3/UL (ref 0–0.4)
EOSINOPHIL NFR BLD AUTO: 4.8 %
ERYTHROCYTE [DISTWIDTH] IN BLOOD BY AUTOMATED COUNT: 14 % (ref 11.5–14.5)
GLUCOSE SERPL-MCNC: 84 MG/DL (ref 74–99)
HCT VFR BLD AUTO: 41 % (ref 41–52)
HDLC SERPL-MCNC: 63.3 MG/DL
HGB BLD-MCNC: 13.2 G/DL (ref 13.5–17.5)
IMM GRANULOCYTES # BLD AUTO: 0.01 X10*3/UL (ref 0–0.5)
IMM GRANULOCYTES NFR BLD AUTO: 0.3 % (ref 0–0.9)
LDLC SERPL CALC-MCNC: 78 MG/DL
LYMPHOCYTES # BLD AUTO: 1.32 X10*3/UL (ref 0.8–3)
LYMPHOCYTES NFR BLD AUTO: 33.5 %
MCH RBC QN AUTO: 28.5 PG (ref 26–34)
MCHC RBC AUTO-ENTMCNC: 32.2 G/DL (ref 32–36)
MCV RBC AUTO: 89 FL (ref 80–100)
MONOCYTES # BLD AUTO: 0.42 X10*3/UL (ref 0.05–0.8)
MONOCYTES NFR BLD AUTO: 10.7 %
NEUTROPHILS # BLD AUTO: 1.98 X10*3/UL (ref 1.6–5.5)
NEUTROPHILS NFR BLD AUTO: 50.2 %
NON HDL CHOLESTEROL: 90 MG/DL (ref 0–149)
NRBC BLD-RTO: 0 /100 WBCS (ref 0–0)
PLATELET # BLD AUTO: 257 X10*3/UL (ref 150–450)
POTASSIUM SERPL-SCNC: 4.2 MMOL/L (ref 3.5–5.3)
PROT SERPL-MCNC: 6.9 G/DL (ref 6.4–8.2)
PSA SERPL-MCNC: <0.1 NG/ML
RBC # BLD AUTO: 4.63 X10*6/UL (ref 4.5–5.9)
SODIUM SERPL-SCNC: 140 MMOL/L (ref 136–145)
TRIGL SERPL-MCNC: 58 MG/DL (ref 0–149)
TSH SERPL-ACNC: 0.89 MIU/L (ref 0.44–3.98)
URATE SERPL-MCNC: 5.2 MG/DL (ref 4–7.5)
VLDL: 12 MG/DL (ref 0–40)
WBC # BLD AUTO: 3.9 X10*3/UL (ref 4.4–11.3)

## 2024-01-23 PROCEDURE — 84153 ASSAY OF PSA TOTAL: CPT

## 2024-01-23 PROCEDURE — 84550 ASSAY OF BLOOD/URIC ACID: CPT

## 2024-01-23 PROCEDURE — 84443 ASSAY THYROID STIM HORMONE: CPT

## 2024-01-23 PROCEDURE — 80053 COMPREHEN METABOLIC PANEL: CPT

## 2024-01-23 PROCEDURE — 85025 COMPLETE CBC W/AUTO DIFF WBC: CPT

## 2024-01-23 PROCEDURE — 36415 COLL VENOUS BLD VENIPUNCTURE: CPT

## 2024-01-23 PROCEDURE — 80061 LIPID PANEL: CPT

## 2024-02-05 LAB — NONINV COLON CA DNA+OCC BLD SCRN STL QL: NORMAL

## 2024-02-12 ENCOUNTER — OFFICE VISIT (OUTPATIENT)
Dept: PRIMARY CARE | Facility: CLINIC | Age: 74
End: 2024-02-12
Payer: MEDICARE

## 2024-02-12 ENCOUNTER — HOSPITAL ENCOUNTER (OUTPATIENT)
Dept: RADIOLOGY | Facility: CLINIC | Age: 74
Discharge: HOME | End: 2024-02-12
Payer: MEDICARE

## 2024-02-12 VITALS
WEIGHT: 189 LBS | BODY MASS INDEX: 27.99 KG/M2 | RESPIRATION RATE: 16 BRPM | SYSTOLIC BLOOD PRESSURE: 120 MMHG | HEART RATE: 72 BPM | DIASTOLIC BLOOD PRESSURE: 75 MMHG | HEIGHT: 69 IN

## 2024-02-12 DIAGNOSIS — S13.4XXA WHIPLASH INJURY TO NECK, INITIAL ENCOUNTER: ICD-10-CM

## 2024-02-12 DIAGNOSIS — S13.4XXA WHIPLASH INJURY TO NECK, INITIAL ENCOUNTER: Primary | ICD-10-CM

## 2024-02-12 DIAGNOSIS — R00.1 PERSISTENT SINUS BRADYCARDIA: ICD-10-CM

## 2024-02-12 DIAGNOSIS — C61 PROSTATIC ADENOCARCINOMA (MULTI): ICD-10-CM

## 2024-02-12 DIAGNOSIS — M60.9 MYOFASCITIS: ICD-10-CM

## 2024-02-12 DIAGNOSIS — E78.00 ELEVATED LDL CHOLESTEROL LEVEL: ICD-10-CM

## 2024-02-12 DIAGNOSIS — I10 BENIGN ESSENTIAL HYPERTENSION: ICD-10-CM

## 2024-02-12 DIAGNOSIS — E04.9 THYROID GOITER: ICD-10-CM

## 2024-02-12 PROBLEM — J01.90 ACUTE SINUSITIS: Status: RESOLVED | Noted: 2024-02-12 | Resolved: 2024-02-12

## 2024-02-12 PROBLEM — D64.9 ANEMIA: Status: RESOLVED | Noted: 2024-02-12 | Resolved: 2024-02-12

## 2024-02-12 PROBLEM — S46.319A STRAIN OF TRICEPS MUSCLE: Status: RESOLVED | Noted: 2024-02-12 | Resolved: 2024-02-12

## 2024-02-12 PROBLEM — M25.559 ARTHRALGIA OF HIP: Status: RESOLVED | Noted: 2024-02-12 | Resolved: 2024-02-12

## 2024-02-12 PROBLEM — N39.0 URINARY TRACT INFECTION: Status: RESOLVED | Noted: 2024-02-12 | Resolved: 2024-02-12
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
## 2024-02-12 PROBLEM — M25.539 PAIN IN WRIST: Status: RESOLVED | Noted: 2024-02-12 | Resolved: 2024-02-12

## 2024-02-12 PROCEDURE — 3074F SYST BP LT 130 MM HG: CPT | Performed by: INTERNAL MEDICINE

## 2024-02-12 PROCEDURE — 3078F DIAST BP <80 MM HG: CPT | Performed by: INTERNAL MEDICINE

## 2024-02-12 PROCEDURE — 99214 OFFICE O/P EST MOD 30 MIN: CPT | Performed by: INTERNAL MEDICINE

## 2024-02-12 PROCEDURE — 1159F MED LIST DOCD IN RCRD: CPT | Performed by: INTERNAL MEDICINE

## 2024-02-12 PROCEDURE — 1036F TOBACCO NON-USER: CPT | Performed by: INTERNAL MEDICINE

## 2024-02-12 PROCEDURE — 72050 X-RAY EXAM NECK SPINE 4/5VWS: CPT

## 2024-02-12 PROCEDURE — 1125F AMNT PAIN NOTED PAIN PRSNT: CPT | Performed by: INTERNAL MEDICINE

## 2024-02-12 RX ORDER — CYCLOBENZAPRINE HCL 10 MG
10 TABLET ORAL NIGHTLY PRN
Qty: 30 TABLET | Refills: 2 | Status: SHIPPED | OUTPATIENT
Start: 2024-02-12 | End: 2024-10-09

## 2024-02-12 ASSESSMENT — ENCOUNTER SYMPTOMS
MUSCULOSKELETAL NEGATIVE: 1
PSYCHIATRIC NEGATIVE: 1
ALLERGIC/IMMUNOLOGIC NEGATIVE: 1
HEMATOLOGIC/LYMPHATIC NEGATIVE: 1
CONSTITUTIONAL NEGATIVE: 1
CARDIOVASCULAR NEGATIVE: 1
NEUROLOGICAL NEGATIVE: 1
GASTROINTESTINAL NEGATIVE: 1
ENDOCRINE NEGATIVE: 1
EYES NEGATIVE: 1
RESPIRATORY NEGATIVE: 1
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
## 2024-02-19 ENCOUNTER — EVALUATION (OUTPATIENT)
Dept: PHYSICAL THERAPY | Facility: CLINIC | Age: 74
End: 2024-02-19
Payer: MEDICARE

## 2024-02-19 DIAGNOSIS — M54.2 NECK PAIN: Primary | ICD-10-CM

## 2024-02-19 DIAGNOSIS — S13.4XXD WHIPLASH INJURY TO NECK, SUBSEQUENT ENCOUNTER: ICD-10-CM

## 2024-02-19 PROCEDURE — 97110 THERAPEUTIC EXERCISES: CPT | Mod: GP | Performed by: PHYSICAL THERAPIST

## 2024-02-19 PROCEDURE — 97161 PT EVAL LOW COMPLEX 20 MIN: CPT | Mod: GP | Performed by: PHYSICAL THERAPIST

## 2024-02-19 ASSESSMENT — ENCOUNTER SYMPTOMS
DEPRESSION: 0
OCCASIONAL FEELINGS OF UNSTEADINESS: 0
LOSS OF SENSATION IN FEET: 0
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
## 2024-02-26 LAB — NONINV COLON CA DNA+OCC BLD SCRN STL QL: NEGATIVE

## 2024-02-28 ENCOUNTER — APPOINTMENT (OUTPATIENT)
Dept: PHYSICAL THERAPY | Facility: CLINIC | Age: 74
End: 2024-02-28
Payer: MEDICARE

## 2024-02-28 ENCOUNTER — DOCUMENTATION (OUTPATIENT)
Dept: PHYSICAL THERAPY | Facility: CLINIC | Age: 74
End: 2024-02-28
Payer: MEDICARE

## 2024-02-28 NOTE — PROGRESS NOTES
Therapy Communication Note    Patient Name: Gee Hardin  MRN: [id number]  Today's Date: 2/28/2024     Discipline: Physical Therapy    Missed Visit Reason:      Missed Time: Cancel    Comment: called to cancel appointment/ personal reasons.

## 2024-03-01 ENCOUNTER — HOSPITAL ENCOUNTER (OUTPATIENT)
Dept: RADIOLOGY | Facility: HOSPITAL | Age: 74
Discharge: HOME | End: 2024-03-01
Payer: MEDICARE

## 2024-03-01 DIAGNOSIS — E04.2 NONTOXIC MULTINODULAR GOITER: ICD-10-CM

## 2024-03-01 PROCEDURE — 76536 US EXAM OF HEAD AND NECK: CPT | Performed by: RADIOLOGY

## 2024-03-01 PROCEDURE — 76536 US EXAM OF HEAD AND NECK: CPT

## 2024-03-04 ENCOUNTER — APPOINTMENT (OUTPATIENT)
Dept: PHYSICAL THERAPY | Facility: CLINIC | Age: 74
End: 2024-03-04
Payer: MEDICARE

## 2024-03-04 ENCOUNTER — TREATMENT (OUTPATIENT)
Dept: PHYSICAL THERAPY | Facility: CLINIC | Age: 74
End: 2024-03-04
Payer: MEDICARE

## 2024-03-04 DIAGNOSIS — M54.2 NECK PAIN: ICD-10-CM

## 2024-03-04 DIAGNOSIS — S13.4XXD WHIPLASH INJURY TO NECK, SUBSEQUENT ENCOUNTER: ICD-10-CM

## 2024-03-04 PROCEDURE — 97110 THERAPEUTIC EXERCISES: CPT | Mod: GP | Performed by: PHYSICAL THERAPIST
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
## 2024-03-11 ENCOUNTER — TREATMENT (OUTPATIENT)
Dept: PHYSICAL THERAPY | Facility: CLINIC | Age: 74
End: 2024-03-11
Payer: MEDICARE

## 2024-03-11 DIAGNOSIS — M54.2 NECK PAIN: ICD-10-CM

## 2024-03-11 DIAGNOSIS — S13.4XXD WHIPLASH INJURY TO NECK, SUBSEQUENT ENCOUNTER: Primary | ICD-10-CM

## 2024-03-11 PROCEDURE — 97140 MANUAL THERAPY 1/> REGIONS: CPT | Mod: GP,CQ | Performed by: SPECIALIST/TECHNOLOGIST

## 2024-03-11 PROCEDURE — 97110 THERAPEUTIC EXERCISES: CPT | Mod: GP,CQ | Performed by: SPECIALIST/TECHNOLOGIST

## 2024-03-11 ASSESSMENT — PAIN SCALES - GENERAL: PAINLEVEL_OUTOF10: 6

## 2024-03-11 ASSESSMENT — PAIN - FUNCTIONAL ASSESSMENT: PAIN_FUNCTIONAL_ASSESSMENT: 0-10
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
## 2024-03-14 ENCOUNTER — OFFICE VISIT (OUTPATIENT)
Dept: SURGERY | Facility: CLINIC | Age: 74
End: 2024-03-14
Payer: MEDICARE

## 2024-03-14 VITALS
SYSTOLIC BLOOD PRESSURE: 127 MMHG | BODY MASS INDEX: 27.49 KG/M2 | HEIGHT: 70 IN | WEIGHT: 192 LBS | DIASTOLIC BLOOD PRESSURE: 78 MMHG | HEART RATE: 60 BPM

## 2024-03-14 DIAGNOSIS — E04.2 MULTINODULAR THYROID: Primary | ICD-10-CM

## 2024-03-14 PROCEDURE — 1036F TOBACCO NON-USER: CPT | Performed by: SURGERY

## 2024-03-14 PROCEDURE — 1160F RVW MEDS BY RX/DR IN RCRD: CPT | Performed by: SURGERY

## 2024-03-14 PROCEDURE — 3074F SYST BP LT 130 MM HG: CPT | Performed by: SURGERY

## 2024-03-14 PROCEDURE — 99213 OFFICE O/P EST LOW 20 MIN: CPT | Performed by: SURGERY

## 2024-03-14 PROCEDURE — 1159F MED LIST DOCD IN RCRD: CPT | Performed by: SURGERY

## 2024-03-14 PROCEDURE — 3078F DIAST BP <80 MM HG: CPT | Performed by: SURGERY
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
## 2024-03-18 ENCOUNTER — TREATMENT (OUTPATIENT)
Dept: PHYSICAL THERAPY | Facility: CLINIC | Age: 74
End: 2024-03-18
Payer: MEDICARE

## 2024-03-18 DIAGNOSIS — M54.2 NECK PAIN: ICD-10-CM

## 2024-03-18 DIAGNOSIS — S13.4XXD WHIPLASH INJURY TO NECK, SUBSEQUENT ENCOUNTER: Primary | ICD-10-CM

## 2024-03-18 PROCEDURE — 97140 MANUAL THERAPY 1/> REGIONS: CPT | Mod: GP,CQ | Performed by: SPECIALIST/TECHNOLOGIST

## 2024-03-18 PROCEDURE — 97110 THERAPEUTIC EXERCISES: CPT | Mod: GP,CQ | Performed by: SPECIALIST/TECHNOLOGIST

## 2024-03-18 ASSESSMENT — PAIN SCALES - GENERAL: PAINLEVEL_OUTOF10: 2

## 2024-03-18 ASSESSMENT — PAIN - FUNCTIONAL ASSESSMENT: PAIN_FUNCTIONAL_ASSESSMENT: 0-10
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
## 2024-04-03 ENCOUNTER — OFFICE VISIT (OUTPATIENT)
Dept: PRIMARY CARE | Facility: CLINIC | Age: 74
End: 2024-04-03
Payer: MEDICARE

## 2024-04-03 VITALS
HEART RATE: 72 BPM | RESPIRATION RATE: 16 BRPM | BODY MASS INDEX: 28.44 KG/M2 | SYSTOLIC BLOOD PRESSURE: 132 MMHG | HEIGHT: 69 IN | DIASTOLIC BLOOD PRESSURE: 72 MMHG | WEIGHT: 192 LBS

## 2024-04-03 DIAGNOSIS — E78.00 ELEVATED LDL CHOLESTEROL LEVEL: ICD-10-CM

## 2024-04-03 DIAGNOSIS — M60.9 MYOFASCITIS: ICD-10-CM

## 2024-04-03 DIAGNOSIS — C61 PROSTATIC ADENOCARCINOMA (MULTI): ICD-10-CM

## 2024-04-03 DIAGNOSIS — M10.9 GOUT, ARTHROPATHY: ICD-10-CM

## 2024-04-03 DIAGNOSIS — M54.2 NECK PAIN: ICD-10-CM

## 2024-04-03 DIAGNOSIS — S13.4XXD WHIPLASH INJURY TO NECK, SUBSEQUENT ENCOUNTER: Primary | ICD-10-CM

## 2024-04-03 DIAGNOSIS — E04.9 THYROID GOITER: ICD-10-CM

## 2024-04-03 PROCEDURE — 3075F SYST BP GE 130 - 139MM HG: CPT | Performed by: INTERNAL MEDICINE

## 2024-04-03 PROCEDURE — 1160F RVW MEDS BY RX/DR IN RCRD: CPT | Performed by: INTERNAL MEDICINE

## 2024-04-03 PROCEDURE — 3078F DIAST BP <80 MM HG: CPT | Performed by: INTERNAL MEDICINE

## 2024-04-03 PROCEDURE — 1036F TOBACCO NON-USER: CPT | Performed by: INTERNAL MEDICINE

## 2024-04-03 PROCEDURE — 1159F MED LIST DOCD IN RCRD: CPT | Performed by: INTERNAL MEDICINE

## 2024-04-03 PROCEDURE — 99214 OFFICE O/P EST MOD 30 MIN: CPT | Performed by: INTERNAL MEDICINE

## 2024-04-03 RX ORDER — ATORVASTATIN CALCIUM 10 MG/1
10 TABLET, FILM COATED ORAL DAILY
Qty: 90 TABLET | Refills: 1 | Status: SHIPPED | OUTPATIENT
Start: 2024-04-03

## 2024-04-03 RX ORDER — ALLOPURINOL 100 MG/1
100 TABLET ORAL DAILY
Qty: 90 TABLET | Refills: 1 | Status: SHIPPED | OUTPATIENT
Start: 2024-04-03

## 2024-04-03 ASSESSMENT — ENCOUNTER SYMPTOMS
EYES NEGATIVE: 1
NEUROLOGICAL NEGATIVE: 1
CARDIOVASCULAR NEGATIVE: 1
GASTROINTESTINAL NEGATIVE: 1
RESPIRATORY NEGATIVE: 1
PSYCHIATRIC NEGATIVE: 1
ALLERGIC/IMMUNOLOGIC NEGATIVE: 1
HEMATOLOGIC/LYMPHATIC NEGATIVE: 1
ENDOCRINE NEGATIVE: 1
CONSTITUTIONAL NEGATIVE: 1
MUSCULOSKELETAL NEGATIVE: 1
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
## 2024-04-08 ENCOUNTER — TREATMENT (OUTPATIENT)
Dept: PHYSICAL THERAPY | Facility: CLINIC | Age: 74
End: 2024-04-08
Payer: MEDICARE

## 2024-04-08 DIAGNOSIS — M54.2 NECK PAIN: ICD-10-CM

## 2024-04-08 DIAGNOSIS — S13.4XXD WHIPLASH INJURY TO NECK, SUBSEQUENT ENCOUNTER: ICD-10-CM

## 2024-04-08 PROCEDURE — 97110 THERAPEUTIC EXERCISES: CPT | Mod: GP | Performed by: PHYSICAL THERAPIST
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
## 2024-04-17 ENCOUNTER — TREATMENT (OUTPATIENT)
Dept: PHYSICAL THERAPY | Facility: CLINIC | Age: 74
End: 2024-04-17
Payer: MEDICARE

## 2024-04-17 DIAGNOSIS — S13.4XXD WHIPLASH INJURY TO NECK, SUBSEQUENT ENCOUNTER: Primary | ICD-10-CM

## 2024-04-17 DIAGNOSIS — M54.2 NECK PAIN: ICD-10-CM

## 2024-04-17 PROCEDURE — 97110 THERAPEUTIC EXERCISES: CPT | Mod: GP,CQ | Performed by: SPECIALIST/TECHNOLOGIST

## 2024-04-17 PROCEDURE — 97140 MANUAL THERAPY 1/> REGIONS: CPT | Mod: GP,CQ | Performed by: SPECIALIST/TECHNOLOGIST

## 2024-04-17 ASSESSMENT — PAIN - FUNCTIONAL ASSESSMENT: PAIN_FUNCTIONAL_ASSESSMENT: 0-10

## 2024-04-17 ASSESSMENT — PAIN SCALES - GENERAL: PAINLEVEL_OUTOF10: 1

## 2024-04-17 NOTE — PROGRESS NOTES
"Physical Therapy  Physical Therapy Treatment    Patient Name: Gee Hardin  MRN: [id number]  Today's Date: 4/17/2024  Time Calculation  Start Time: 0945  Stop Time: 1025  Time Calculation (min): 40 min    Visit 6 of [facility]  Reason for visit: neck/whiplash   Referring MD: Daniel Ortiz   Insurance: Medicare/AARP, no auth, [place]   DX; neck pain, whiplash   POC : 1/week 4-6 weeks  Certification Period Start Date: 02/19/24  Certification Period End Date: 05/19/24      Current Problem  1. Whiplash injury to neck, subsequent encounter  Follow Up In Physical Therapy      2. Neck pain  Follow Up In Physical Therapy            General  Reason for Referral: neck pain / whiplash  Referred By: Daniel Ortiz  Past Medical History Relevant to Rehab: Prostate CA,  Precautions  Precautions  STEADI Fall Risk Score (The score of 4 or more indicates an increased risk of falling): 1  Pain  Pain Assessment: 0-10  Pain Score: 1    Subjective:   Subjective   Patient notes feeling stiff with very little pain on arrival today.  Patient notes having good days and bad days with bad days attributed to issues with sleeping.    HEP compliant- yes     Objective:   Objective   Cervical spine ROM  Flex=WFL  Ext=55  Left rotation= 70  Right rotation=70  Left side bend=10  Right side bend=10    Scapular Strength  Mid trap  L= 4-/5[] R= 4-/5  low trap  L= 4/5[] R= 4/5    Treatments:  UBE L3 x3' F/R    Bravo Rows 7.5# 20x  Bravo Stand ext 5# 20x  Bravo Seated lats 20# 20x  Bottoms Up carry 2 kg Kb 20 feet x4 R/L   Wall slide stability 4 pos 10x R/L   Upper trap stretch 30\"/3 ea   Cervical isometrics Ext, R/L SB 10x3s  Cross fiber pecs  SS pecs R/L 1'   SS R/L Int/ext Rot 1'   Cross fiber cervical ext/SCM/Scalene/UT/Lev Scap 8 min   Door stretch high 2 pos 1'     TE x2, Man (cq)     Access Code: [email or web address]   Assessment:   Patient noted feeling good post treatment.  Patient still had mild stiffness on L.     Plan:    Continue per POC to improve ROM, " flexibility and strength to decrease pain and allow return to prior level of function.  Patient has re-check on 5/16/2024 with Jose Luis Etienne, PT       Nasir Anderson, PTA

## 2024-04-24 ENCOUNTER — TREATMENT (OUTPATIENT)
Dept: PHYSICAL THERAPY | Facility: CLINIC | Age: 74
End: 2024-04-24
Payer: MEDICARE

## 2024-04-24 DIAGNOSIS — M54.2 NECK PAIN: ICD-10-CM

## 2024-04-24 DIAGNOSIS — S13.4XXD WHIPLASH INJURY TO NECK, SUBSEQUENT ENCOUNTER: ICD-10-CM

## 2024-04-24 PROCEDURE — 97110 THERAPEUTIC EXERCISES: CPT | Mod: GP,CQ | Performed by: SPECIALIST/TECHNOLOGIST

## 2024-04-24 PROCEDURE — 97140 MANUAL THERAPY 1/> REGIONS: CPT | Mod: GP,CQ | Performed by: SPECIALIST/TECHNOLOGIST

## 2024-04-24 ASSESSMENT — PAIN SCALES - GENERAL: PAINLEVEL_OUTOF10: 3

## 2024-04-24 ASSESSMENT — PAIN - FUNCTIONAL ASSESSMENT: PAIN_FUNCTIONAL_ASSESSMENT: 0-10

## 2024-04-24 NOTE — PROGRESS NOTES
"Physical Therapy  Physical Therapy Treatment    Patient Name: Gee Hardin  MRN: [id number]  Today's Date: 4/24/2024  Time Calculation  Start Time: 0948  Stop Time: 1028  Time Calculation (min): 40 min    Visit 7 of [facility]  Reason for visit: neck/whiplash   Referring MD: Daniel Ortiz   Insurance: Medicare/AARP, [place] auth, [place]   DX; neck pain, whiplash   POC : 1/week 4-6 weeks  Certification Period Start Date: 02/19/24  Certification Period End Date: 05/19/24      Current Problem  1. Whiplash injury to neck, subsequent encounter  Follow Up In Physical Therapy      2. Neck pain  Follow Up In Physical Therapy        General  Reason for Referral: neck pain / whiplash  Referred By: Daniel Ortiz  Past Medical History Relevant to Rehab: Prostate CA,  Precautions  Precautions  STEADI Fall Risk Score (The score of 4 or more indicates an increased risk of falling): 1  Pain  Pain Assessment: 0-10  Pain Score: 3    Subjective:   Subjective   Patient notes feeling a bit better with pain 30-35%.   Patient noted good effort of strengthening last session without soreness afterwards.  HEP compliant- yes     Objective:   Objective   Cervical spine ROM  Flex=WFL  Ext=55  Left rotation= 70  Right rotation=70  Left side bend=10  Right side bend=10    Scapular Strength  Mid trap  L= 4-/5[] R= 4-/5  low trap  L= 4/5[] R= 4/5    Treatments:  UBE L3 x3' F/R    Bravo Rows 7.5# 20x  Bravo Stand ext 5# 20x  Bravo Seated lats 20# 20x  Bottoms Up carry 2 kg Kb 20 feet x4 R/L   Wall slide stability 4 pos 10x R/L   Upper trap stretch 30\"/3 ea   Cervical isometrics Ext, R/L SB 10x3s  Cross fiber pecs  SS pecs R/L 1'   SS R/L Int/ext Rot 1'   Cross fiber cervical ext/SCM/Scalene/UT/Lev Scap 8 min   Door stretch high 2 pos 1'     TE x2, Man (cq)     Access Code: [email or web address]   Assessment:   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  Patient notes no stiffness post treatment.    Plan:    Continue per POC to improve ROM, flexibility and " strength to decrease pain and allow return to prior level of function.  Patient has re-check on 5/16/2024 with Jose Luis Etienne, PT       Nasir Anderson, PTA

## 2024-05-02 ENCOUNTER — TREATMENT (OUTPATIENT)
Dept: PHYSICAL THERAPY | Facility: CLINIC | Age: 74
End: 2024-05-02
Payer: MEDICARE

## 2024-05-02 DIAGNOSIS — S13.4XXD WHIPLASH INJURY TO NECK, SUBSEQUENT ENCOUNTER: ICD-10-CM

## 2024-05-02 DIAGNOSIS — M54.2 NECK PAIN: ICD-10-CM

## 2024-05-02 PROCEDURE — 97140 MANUAL THERAPY 1/> REGIONS: CPT | Mod: GP | Performed by: PHYSICAL THERAPIST

## 2024-05-02 PROCEDURE — 97110 THERAPEUTIC EXERCISES: CPT | Mod: GP | Performed by: PHYSICAL THERAPIST

## 2024-05-02 NOTE — PROGRESS NOTES
"Physical Therapy  Physical Therapy Treatment    Patient Name: Gee Hardin  MRN: [id number]  Today's Date: 5/2/2024  Time Calculation  Start Time: 0755  Stop Time: 0833  Time Calculation (min): 38 min    Visit 8 of [facility]  Reason for visit: neck/whiplash   Referring MD: Daniel Ortiz   Insurance: Medicare/AARP, [place] auth, [place]   DX; neck pain, whiplash   POC : 1/week 4-6 weeks  Certification Period Start Date: 02/19/24  Certification Period End Date: 05/19/24      Current Problem  1. Whiplash injury to neck, subsequent encounter  Follow Up In Physical Therapy      2. Neck pain  Follow Up In Physical Therapy          General  Reason for Referral: neck pain / whiplash  Referred By: Daniel Ortiz  Past Medical History Relevant to Rehab: Prostate CA,  Precautions            Subjective:   Subjective   Pain not bad, definitely less 4/10 in my neck.  More stress than pain today.    HEP compliant- yes     Objective:   Objective     10 min late for appointment 5/2/24    Cervical spine ROM  Flex=WFL  Ext=55  Left rotation= 70  Right rotation=70  Left side bend=10  Right side bend=10    Scapular Strength  Mid trap  L= 4/5[] R= 4+/5  low trap  L= 4/5[] R= 4+/5    Treatments:  UBE L3 x3' F/R    Prone t 2x10 amanda (Added to HEP)    Bravo Rows 7.5# 20x  Bravo Stand ext 5# 20x  Bravo Seated lats 20# 20x  Bottoms Up carry 4 kg Kb 20 feet x4 R/L   Wall slide stability 4 pos 10x R/L   Upper trap stretch 30\"/3 ea   Cervical isometrics flex, Ext, R/L SB 10x3s  Cross fiber pecs, Cross fiber cervical ext/SCM/Scalene/UT/Lev Scap 8 min   Door stretch  2 pos 1'     TE x2, Man     Access Code: [email or web address]   Assessment:   No noted neck pain after manual work.  Tolerated increased weight and new exercise w/o increased pain.      Plan:    Continue per POC to improve ROM, flexibility and strength to decrease pain and allow return to prior level of function.  Patient has re-check on 5/16/2024 with Jose Luis Etienne, PT       Jose Luis Etienne, PT  "

## 2024-05-09 ENCOUNTER — TREATMENT (OUTPATIENT)
Dept: PHYSICAL THERAPY | Facility: CLINIC | Age: 74
End: 2024-05-09
Payer: MEDICARE

## 2024-05-09 DIAGNOSIS — M54.2 NECK PAIN: ICD-10-CM

## 2024-05-09 DIAGNOSIS — S13.4XXD WHIPLASH INJURY TO NECK, SUBSEQUENT ENCOUNTER: ICD-10-CM

## 2024-05-09 PROCEDURE — 97140 MANUAL THERAPY 1/> REGIONS: CPT | Mod: GP,CQ | Performed by: SPECIALIST/TECHNOLOGIST

## 2024-05-09 PROCEDURE — 97110 THERAPEUTIC EXERCISES: CPT | Mod: GP,CQ | Performed by: SPECIALIST/TECHNOLOGIST

## 2024-05-09 ASSESSMENT — PAIN - FUNCTIONAL ASSESSMENT: PAIN_FUNCTIONAL_ASSESSMENT: 0-10

## 2024-05-09 ASSESSMENT — PAIN SCALES - GENERAL: PAINLEVEL_OUTOF10: 8

## 2024-05-09 NOTE — PROGRESS NOTES
"Physical Therapy  Physical Therapy Treatment    Patient Name: Gee Hardin  MRN: [id number]  Today's Date: 5/9/2024  Time Calculation  Start Time: 0858  Stop Time: 0942  Time Calculation (min): 44 min    Visit 9 of [facility]  Reason for visit: neck/whiplash   Referring MD: Daniel Ortiz   Insurance: Medicare/AARP, [place] auth, [place]   DX; neck pain, whiplash   POC : 1/week 4-6 weeks  Certification Period Start Date: 02/19/24  Certification Period End Date: 05/19/24      Current Problem  1. Whiplash injury to neck, subsequent encounter  Follow Up In Physical Therapy      2. Neck pain  Follow Up In Physical Therapy          General  Reason for Referral: neck pain / whiplash  Referred By: Daniel Ortiz  Past Medical History Relevant to Rehab: Prostate CA,  Precautions  Precautions  STEADI Fall Risk Score (The score of 4 or more indicates an increased risk of falling): 1    Pain Assessment: 0-10  Pain Score: 8    Subjective:   Subjective   Patient notes feeling great after therapy session but has had increased pain over the last few days including needing to return to pain meds.    HEP compliant- yes     Objective:   Objective     Cervical spine ROM  Flex=WFL  Ext=55  Left rotation= 70  Right rotation=70  Left side bend=10  Right side bend=10    Scapular Strength  Mid trap  L= 4/5[] R= 4+/5  low trap  L= 4/5[] R= 4+/5    Treatments:  UBE L3 x3' F/R    Prone t 2x10 amanda (Added to HEP)    Bravo Rows 7.5# 20x  Bravo Stand ext 5# 20x  Bravo Seated lats 20# 20x  Bottoms Up carry 4 kg Kb 20 feet x4 R/L   Wall slide stability 4 pos 10x R/L   Upper trap stretch 30\"/3 ea   Cervical isometrics flex, Ext, R/L SB 10x3s  Cross fiber pecs, Cross fiber cervical ext/SCM/Scalene/UT/Lev Scap 8 min   Door stretch  2 pos 1'     TE x2, Man     Access Code: [email or web address]   Assessment:   Patient tolerated intensity with minimal fatigue noting challenge of bottoms up carry.  Patient noted feeling better post treatment.    Plan:    Continue per POC to " improve ROM, flexibility and strength to decrease pain and allow return to prior level of function.  Patient has re-check on 5/16/2024 with Jose Luis Etienne, PT       Nasir Anderson, PTA

## 2024-05-15 ENCOUNTER — OFFICE VISIT (OUTPATIENT)
Dept: PRIMARY CARE | Facility: CLINIC | Age: 74
End: 2024-05-15
Payer: MEDICARE

## 2024-05-15 ENCOUNTER — TREATMENT (OUTPATIENT)
Dept: PHYSICAL THERAPY | Facility: CLINIC | Age: 74
End: 2024-05-15
Payer: MEDICARE

## 2024-05-15 VITALS
DIASTOLIC BLOOD PRESSURE: 80 MMHG | BODY MASS INDEX: 28.44 KG/M2 | HEART RATE: 57 BPM | HEIGHT: 69 IN | RESPIRATION RATE: 16 BRPM | SYSTOLIC BLOOD PRESSURE: 130 MMHG | OXYGEN SATURATION: 96 % | WEIGHT: 192 LBS

## 2024-05-15 DIAGNOSIS — M51.16 LUMBAR DISC DISEASE WITH RADICULOPATHY: ICD-10-CM

## 2024-05-15 DIAGNOSIS — E78.00 ELEVATED LDL CHOLESTEROL LEVEL: ICD-10-CM

## 2024-05-15 DIAGNOSIS — E04.9 THYROID GOITER: ICD-10-CM

## 2024-05-15 DIAGNOSIS — S13.4XXD WHIPLASH INJURY TO NECK, SUBSEQUENT ENCOUNTER: Primary | ICD-10-CM

## 2024-05-15 DIAGNOSIS — R00.1 BRADYCARDIA: ICD-10-CM

## 2024-05-15 DIAGNOSIS — R07.89 OTHER CHEST PAIN: Primary | ICD-10-CM

## 2024-05-15 DIAGNOSIS — M54.2 NECK PAIN: ICD-10-CM

## 2024-05-15 DIAGNOSIS — I10 BENIGN ESSENTIAL HYPERTENSION: ICD-10-CM

## 2024-05-15 PROCEDURE — 97110 THERAPEUTIC EXERCISES: CPT | Mod: GP

## 2024-05-15 PROCEDURE — 1036F TOBACCO NON-USER: CPT | Performed by: INTERNAL MEDICINE

## 2024-05-15 PROCEDURE — 99214 OFFICE O/P EST MOD 30 MIN: CPT | Performed by: INTERNAL MEDICINE

## 2024-05-15 PROCEDURE — 3079F DIAST BP 80-89 MM HG: CPT | Performed by: INTERNAL MEDICINE

## 2024-05-15 PROCEDURE — 1159F MED LIST DOCD IN RCRD: CPT | Performed by: INTERNAL MEDICINE

## 2024-05-15 PROCEDURE — 1160F RVW MEDS BY RX/DR IN RCRD: CPT | Performed by: INTERNAL MEDICINE

## 2024-05-15 PROCEDURE — 3075F SYST BP GE 130 - 139MM HG: CPT | Performed by: INTERNAL MEDICINE

## 2024-05-15 PROCEDURE — 93000 ELECTROCARDIOGRAM COMPLETE: CPT | Performed by: INTERNAL MEDICINE

## 2024-05-15 ASSESSMENT — ENCOUNTER SYMPTOMS
LOSS OF SENSATION IN FEET: 0
EYES NEGATIVE: 1
PSYCHIATRIC NEGATIVE: 1
CONSTITUTIONAL NEGATIVE: 1
MUSCULOSKELETAL NEGATIVE: 1
NEUROLOGICAL NEGATIVE: 1
RESPIRATORY NEGATIVE: 1
OCCASIONAL FEELINGS OF UNSTEADINESS: 0
HEMATOLOGIC/LYMPHATIC NEGATIVE: 1
DEPRESSION: 0
ENDOCRINE NEGATIVE: 1
GASTROINTESTINAL NEGATIVE: 1
ALLERGIC/IMMUNOLOGIC NEGATIVE: 1
CARDIOVASCULAR NEGATIVE: 1

## 2024-05-15 ASSESSMENT — PATIENT HEALTH QUESTIONNAIRE - PHQ9
2. FEELING DOWN, DEPRESSED OR HOPELESS: NOT AT ALL
1. LITTLE INTEREST OR PLEASURE IN DOING THINGS: NOT AT ALL
SUM OF ALL RESPONSES TO PHQ9 QUESTIONS 1 AND 2: 0

## 2024-05-15 ASSESSMENT — PAIN SCALES - GENERAL: PAINLEVEL_OUTOF10: 5 - MODERATE PAIN

## 2024-05-15 ASSESSMENT — PAIN - FUNCTIONAL ASSESSMENT: PAIN_FUNCTIONAL_ASSESSMENT: 0-10

## 2024-05-15 NOTE — PROGRESS NOTES
Physical Therapy  Physical Therapy Orthopedic Discharge Note    Patient Name: Gee Hardin  MRN: [id number]  Today's Date: 5/15/2024  Time Calculation  Start Time: 0915  Stop Time: 1000  Time Calculation (min): 45 min    Visit 10 of [facility]  Reason for visit: neck/whiplash   Referring MD: Daniel Ortiz   Insurance: Medicare/AARP, [place] auth, [place]   DX; neck pain, whiplash   POC : 1/week 4-6 weeks  Certification Period Start Date: 02/19/24  Certification Period End Date: 05/19/24       Current Problem  1. Whiplash injury to neck, subsequent encounter        2. Neck pain            Precautions: none         Subjective:    Patient reports doing exercises at, feels like he could graduate and manage on his own,does not feel it is realistic to achieve 100% of what they were before.    Current Condition:   Better    Pain:  Pain Assessment: 0-10  Pain Score: 5 - Moderate pain  Pain Location: Neck  Pain Orientation: Left  Location: L side- scalenes/ SCM  Description: ache that's just there  Aggravating Factors: postural and L rotation still slightly limited  Relieving Factors:  Stretching    Self Reported Function (0-100%) = 65%  (100% being back to PLOF)    Objective:  Objective     Pull forward & update pertinent objective measures taken at evaluation/last progress note  Observation/Posture: rounded shoulders forward head, thoracic kyphosis          Cervical spine ROM  Flex=WFL  Ext=80%   Left rotation= 75%- after exercise  Right rotation=75%- after exercise  Left side bend=20%  Right side bend=20%     Repeated motion testing:     Shoulder AROM WFL J LUIS      Shoulder Strength MMT  Flex                  L= 5/5[] R= 5/5  Abduction        L= 5/5[] R= 5/5  IR                     L= 5/5[] R= 5/5  ER                    L= 5/5[] R= 5/5             Outcome Measures: Updated 5/15/2024  Other Measures  Neck Disability Index: 4/50     EDUCATION: home exercise program, plan of care, activity modifications, pain management, and injury  pathology       Goals: Updated 5/15/2024  Resolved       PT Problem       PT Goal 1 (Met)       Start:  02/19/24    Expected End:  03/18/24    Resolved:  05/15/24    Patient will demonstrate good understanding and compliance with HEP -goal met   Decrease pain by 50% -goal met          PT Goal 2 (Met)       Start:  02/19/24    Expected End:  04/01/24    Resolved:  05/15/24    Neck ROM increased 10 degrees rota and side bending to improve function in ADL's -goal met for rotation   Increase strength postural muscles for patient to maintain and self correct posture during session  Report independence with Adl's and responsibilities  w/o increased pain - in progress/progressing   Decrease pain to  1/10 - in progress/progressing          Able to achieve pain free neck motion with exercises. Symptoms can be self managed    Plan of care was developed with input and agreement by the patient      Treatment Performed:  Repeated ext x10  EXT SNAG x10  Assisted rot SNAG 2x10 to L  Open book x15ea  T-spine ext over chair 2x15  Band ER x10  Band pullapart: 2x15    Codes:  TEx3      Assessment: The focus of the session was Strengthening, ROM, Stretching, joint mobilizations, and Motor Control. The pt demonstrated Good tolerance to the noted exercises today. The pt is demonstrated Good progress in skilled rehab at this time. The pt is still limited in overall Strength, ROM, and Pain at this time. The pt continues to be a good candidate for skilled PT, in order to further improve Strength, ROM, and Pain. However, pt reported feeling little to no pain after exercise and feels if he continues new exercises at home he agrees he will be back to where he feels he needs to be. Much better than reported 65% at beginning of session.    Rehab Prognosis: Good (Pt agrees to be able to manage symptoms at home)      Plan: Updated 5/15/2024  PT Plan: No Additional PT interventions required at this time  Plan of Care Agreement: Patient      After  lengthy discussion with pt, pt agreed they can handle exercises on own at home and no longer need to attend therapy to manage symptoms and will be able to return to normal function.      Lazarus Unger PT, DPT #[id number]

## 2024-05-15 NOTE — ASSESSMENT & PLAN NOTE
Bradycardia = Asymptomatic  and EKG shows no ischemia  - we will follow and get a stress test in light of his chest pain

## 2024-05-15 NOTE — ASSESSMENT & PLAN NOTE
CP - Chest Pain, atypical/angina, EKG with no significant ischemic changes. Referred patient to stress test. Risk factors includes: HTN, Hypercholesterolemia, age, family history,                                                                              Control risk factors and educate diet and life style changes.

## 2024-05-15 NOTE — ASSESSMENT & PLAN NOTE
HTN - hypertension well/controlled .Target BP < 130/80 achieved. Educate low salt diet and exercise with weight loss. Educate home self monitoring and diary keeping. Educate risks of elevate blood pressure and benefits of prompt treatment. Refill

## 2024-05-15 NOTE — PROGRESS NOTES
"Subjective   Patient ID: Gee Hardin is a 74 y.o. male who presents for Follow-up (Chest discomfort). Chests pains with deep breathing for one day after eating a big meal the night before     HPI     Review of Systems   Constitutional: Negative.    HENT: Negative.     Eyes: Negative.    Respiratory: Negative.     Cardiovascular: Negative.    Gastrointestinal: Negative.    Endocrine: Negative.    Musculoskeletal: Negative.    Skin: Negative.    Allergic/Immunologic: Negative.    Neurological: Negative.    Hematological: Negative.    Psychiatric/Behavioral: Negative.     All other systems reviewed and are negative.      Objective   Pulse 57   Resp 16   Ht 1.753 m (5' 9\")   Wt 87.1 kg (192 lb)   SpO2 96%   BMI 28.35 kg/m²   Blood pressure 130/80, pulse 57, resp. rate 16, height 1.753 m (5' 9\"), weight 87.1 kg (192 lb), SpO2 96%.   Physical Exam  Vitals and nursing note reviewed.   Constitutional:       Appearance: Normal appearance.   HENT:      Head: Normocephalic and atraumatic.      Right Ear: Tympanic membrane, ear canal and external ear normal.      Left Ear: Tympanic membrane, ear canal and external ear normal. There is no impacted cerumen.      Nose: Nose normal.      Mouth/Throat:      Mouth: Mucous membranes are moist.      Pharynx: Oropharynx is clear.   Eyes:      Extraocular Movements: Extraocular movements intact.      Conjunctiva/sclera: Conjunctivae normal.      Pupils: Pupils are equal, round, and reactive to light.   Cardiovascular:      Rate and Rhythm: Normal rate and regular rhythm.      Pulses: Normal pulses.      Heart sounds: Normal heart sounds. No murmur heard.  Pulmonary:      Effort: Pulmonary effort is normal. No respiratory distress.      Breath sounds: Normal breath sounds. No stridor. No wheezing, rhonchi or rales.   Chest:      Chest wall: No tenderness.   Abdominal:      General: Abdomen is flat. Bowel sounds are normal. There is no distension.      Palpations: Abdomen is soft. " There is no mass.      Tenderness: There is no abdominal tenderness. There is no right CVA tenderness, left CVA tenderness, guarding or rebound.      Hernia: No hernia is present.   Musculoskeletal:         General: Normal range of motion.      Cervical back: Normal range of motion and neck supple.   Skin:     General: Skin is warm.      Capillary Refill: Capillary refill takes less than 2 seconds.   Neurological:      General: No focal deficit present.      Mental Status: He is alert.      Cranial Nerves: No cranial nerve deficit.      Sensory: No sensory deficit.      Motor: No weakness.      Coordination: Coordination normal.      Gait: Gait normal.      Deep Tendon Reflexes: Reflexes normal.   Psychiatric:         Mood and Affect: Mood normal.         Behavior: Behavior normal. Behavior is cooperative.         Thought Content: Thought content normal.         Judgment: Judgment normal.         Assessment/Plan   Problem List Items Addressed This Visit             ICD-10-CM    Benign essential hypertension I10     HTN - hypertension well/controlled .Target BP < 130/80 achieved. Educate low salt diet and exercise with weight loss. Educate home self monitoring and diary keeping. Educate risks of elevate blood pressure and benefits of prompt treatment. Refill          Elevated LDL cholesterol level E78.00     Daniel Ortiz MD1/22/2024 3:28 PM    Hypercholesterolemia - Monitor lipid profile and educate patient upon risks of high cholesterol and targets. Educate diet and change in lifestyle and increase in exercises - Refill: Atorvastatin  and educate compliance with medication and diet.               Lumbar disc disease with radiculopathy M51.16     DDD - Degenerative disc disease of the Lumbo-Sacral (LS)/spine. Educate exercises and referred patient to Physical Therapy (PT). Ordered X-Ray's of the LS/ spine. Consider MRI. Radiculopathy in the distribution of L4-L5-S1/ nerve roots, needs NSAIDS (Naprosin 500mg  BID vs. Arthrotec 75mg BID or Prednisone taper (Medrol dose pack), Flexeril 10 mg qHS, heat application, and pain control with Tylenol           Thyroid goiter E04.9     Thyroid goiter and follows with Dr. Abreu surgery and stable          Other chest pain - Primary R07.89     CP - Chest Pain, atypical/angina, EKG with no significant ischemic changes. Referred patient to stress test. Risk factors includes: HTN, Hypercholesterolemia, age, family history,                                                                              Control risk factors and educate diet and life style changes.                                                                                                                 Relevant Orders    Nuclear Stress Test     Bradycardia = Asymptomatic  and EKG shows no ischemia  - we will follow and get a stress test in light of his chest pain      Benign essential hypertension I10           HTN - hypertension well/controlled .Target BP < 130/80  achieved. Educate low salt diet and exercise with weight loss. Educate home self monitoring and diary keeping. Educate risks of elevate blood pressure and benefits of prompt treatment.  Refill            Relevant Orders       Comprehensive Metabolic Panel       Elevated LDL cholesterol level E78.00         Hypercholesterolemia - Monitor lipid profile and educate patient upon risks of high cholesterol and targets. Educate diet and change in lifestyle and increase in exercises - Refill: Atorvastatin  and educate compliance with medication and diet.              Relevant Orders       Lipid Panel       Lumbar disc disease with radiculopathy M51.16         DDD - Degenerative disc disease of the Lumbo-Sacral (LS)/spine. Educate exercises and referred patient to Physical Therapy (PT). Ordered X-Ray's of the LS/ spine. Consider MRI. Radiculopathy in the distribution of L4-L5-S1/ nerve roots, needs NSAIDS (Naprosin 500mg BID vs. Arthrotec 75mg BID or Prednisone  taper (Medrol dose pack), Flexeril 10 mg qHS, heat application, and pain control with Tylenol             Neurogenic bladder N31.9       Multiple thyroid nodules E04.2         .goiter and thyroid nodule and check US and function  and follows with surgery            Relevant Orders       TSH with reflex to Free T4 if abnormal                               Immunizations/Injections       very important  Immunizations from outside sources need reconciliation.       Influenza, High Dose Seasonal, Preservative Free10/10/2022, 10/8/2021, 10/5/2020,  ... (5 more)  Influenza, Seasonal, Quadrivalent, Adjuvanted8/31/2023  Influenza, Zouaypsiair39/10/2022, 10/8/2021, 11/19/2010  Influenza, seasonal, swizrqzbrh29/4/2013, 10/30/2012  Moderna COVID-19 vaccine, Fall 2023, 12 yeasrs and older (50mcg/0.5mL)9/30/2023  Moderna COVID-19 vaccine, bivalent, blue cap/gray label *Check age/dose*9/18/2022  Moderna SARS-CoV-2 Vaccination2/12/2022, 8/15/2021, 3/4/2021,  ... (1 more)  Pneumococcal conjugate vaccine, 13-valent (PREVNAR 13)9/17/2015  Pneumococcal conjugate vaccine, 20-valent (PREVNAR 20)8/31/2023  Pneumococcal polysaccharide vaccine, 23-valent, age 2 years and older (PNEUMOVAX 23)5/9/2021, 12/22/2014  Td vaccine, age 7 years and older (TDVAX)8/21/2009  Zoster vaccine, recombinant, adult (SHINGRIX)5/9/2021, 3/5/2019  Zoster, live9/14/2011                   Immunizations/Injections      very important  Immunizations from outside sources need reconciliation.      Influenza, High Dose Seasonal, Preservative Free10/10/2022, 10/8/2021, 10/5/2020,  ... (5 more)  Influenza, Seasonal, Quadrivalent, Adjuvanted8/31/2023  Influenza, Ihziyeosjlv89/10/2022, 10/8/2021, 11/19/2010  Influenza, seasonal, wmxrkvqryt53/4/2013, 10/30/2012  Moderna COVID-19 vaccine, Fall 2023, 12 yeasrs and older (50mcg/0.5mL)9/30/2023  Moderna COVID-19 vaccine, bivalent, blue cap/gray label *Check age/dose*9/18/2022  Moderna SARS-CoV-2 Vaccination2/12/2022,  8/15/2021, 3/4/2021,  ... (1 more)  Pneumococcal conjugate vaccine, 13-valent (PREVNAR 13)9/17/2015  Pneumococcal conjugate vaccine, 20-valent (PREVNAR 20)8/31/2023  Pneumococcal polysaccharide vaccine, 23-valent, age 2 years and older (PNEUMOVAX 23)5/9/2021, 12/22/2014  Td vaccine, age 7 years and older (TDVAX)8/21/2009  Zoster vaccine, recombinant, adult (SHINGRIX)5/9/2021, 3/5/2019  Zoster, live9/14/2011

## 2024-05-16 ENCOUNTER — APPOINTMENT (OUTPATIENT)
Dept: PHYSICAL THERAPY | Facility: CLINIC | Age: 74
End: 2024-05-16
Payer: MEDICARE

## 2024-05-24 ENCOUNTER — HOSPITAL ENCOUNTER (OUTPATIENT)
Dept: RADIOLOGY | Facility: HOSPITAL | Age: 74
Discharge: HOME | End: 2024-05-24
Payer: MEDICARE

## 2024-05-24 ENCOUNTER — HOSPITAL ENCOUNTER (OUTPATIENT)
Dept: CARDIOLOGY | Facility: HOSPITAL | Age: 74
Discharge: HOME | End: 2024-05-24
Payer: MEDICARE

## 2024-05-24 DIAGNOSIS — R07.89 OTHER CHEST PAIN: ICD-10-CM

## 2024-05-24 PROCEDURE — 3430000001 HC RX 343 DIAGNOSTIC RADIOPHARMACEUTICALS: Performed by: INTERNAL MEDICINE

## 2024-05-24 PROCEDURE — 93018 CV STRESS TEST I&R ONLY: CPT

## 2024-05-24 PROCEDURE — 93017 CV STRESS TEST TRACING ONLY: CPT

## 2024-05-24 PROCEDURE — A9502 TC99M TETROFOSMIN: HCPCS | Performed by: INTERNAL MEDICINE

## 2024-05-24 PROCEDURE — 78452 HT MUSCLE IMAGE SPECT MULT: CPT

## 2024-05-24 PROCEDURE — 93016 CV STRESS TEST SUPVJ ONLY: CPT

## 2024-05-24 PROCEDURE — 78452 HT MUSCLE IMAGE SPECT MULT: CPT | Performed by: RADIOLOGY

## 2024-05-24 RX ADMIN — TETROFOSMIN 10.4 MILLICURIE: 0.23 INJECTION, POWDER, LYOPHILIZED, FOR SOLUTION INTRAVENOUS at 07:17

## 2024-05-24 RX ADMIN — TETROFOSMIN 33.4 MILLICURIE: 0.23 INJECTION, POWDER, LYOPHILIZED, FOR SOLUTION INTRAVENOUS at 08:30

## 2024-06-17 ENCOUNTER — APPOINTMENT (OUTPATIENT)
Dept: PRIMARY CARE | Facility: CLINIC | Age: 74
End: 2024-06-17
Payer: MEDICARE

## 2024-06-17 VITALS
RESPIRATION RATE: 17 BRPM | OXYGEN SATURATION: 98 % | SYSTOLIC BLOOD PRESSURE: 120 MMHG | BODY MASS INDEX: 28.44 KG/M2 | WEIGHT: 192 LBS | DIASTOLIC BLOOD PRESSURE: 70 MMHG | HEART RATE: 63 BPM | HEIGHT: 69 IN

## 2024-06-17 DIAGNOSIS — E78.00 ELEVATED LDL CHOLESTEROL LEVEL: ICD-10-CM

## 2024-06-17 DIAGNOSIS — M51.36 DEGENERATIVE DISC DISEASE, LUMBAR: ICD-10-CM

## 2024-06-17 DIAGNOSIS — I10 BENIGN ESSENTIAL HYPERTENSION: Primary | ICD-10-CM

## 2024-06-17 DIAGNOSIS — N31.9 NEUROGENIC BLADDER: ICD-10-CM

## 2024-06-17 DIAGNOSIS — E04.9 THYROID GOITER: ICD-10-CM

## 2024-06-17 DIAGNOSIS — C61 PROSTATIC ADENOCARCINOMA (MULTI): ICD-10-CM

## 2024-06-17 PROCEDURE — 3078F DIAST BP <80 MM HG: CPT | Performed by: INTERNAL MEDICINE

## 2024-06-17 PROCEDURE — 3074F SYST BP LT 130 MM HG: CPT | Performed by: INTERNAL MEDICINE

## 2024-06-17 PROCEDURE — 1036F TOBACCO NON-USER: CPT | Performed by: INTERNAL MEDICINE

## 2024-06-17 PROCEDURE — 1159F MED LIST DOCD IN RCRD: CPT | Performed by: INTERNAL MEDICINE

## 2024-06-17 PROCEDURE — 99214 OFFICE O/P EST MOD 30 MIN: CPT | Performed by: INTERNAL MEDICINE

## 2024-06-17 ASSESSMENT — ENCOUNTER SYMPTOMS
ENDOCRINE NEGATIVE: 1
HEMATOLOGIC/LYMPHATIC NEGATIVE: 1
ALLERGIC/IMMUNOLOGIC NEGATIVE: 1
RESPIRATORY NEGATIVE: 1
MUSCULOSKELETAL NEGATIVE: 1
CONSTITUTIONAL NEGATIVE: 1
PSYCHIATRIC NEGATIVE: 1
EYES NEGATIVE: 1
GASTROINTESTINAL NEGATIVE: 1
CARDIOVASCULAR NEGATIVE: 1
NEUROLOGICAL NEGATIVE: 1

## 2024-06-17 NOTE — PROGRESS NOTES
"Subjective   Patient ID: Gee Hardin is a 74 y.o. male who presents for Follow-up (1 month follow up).    HPI     Review of Systems   Constitutional: Negative.    HENT: Negative.     Eyes: Negative.    Respiratory: Negative.     Cardiovascular: Negative.    Gastrointestinal: Negative.    Endocrine: Negative.    Musculoskeletal: Negative.    Skin: Negative.    Allergic/Immunologic: Negative.    Neurological: Negative.    Hematological: Negative.    Psychiatric/Behavioral: Negative.     All other systems reviewed and are negative.      Objective   Pulse 63   Resp 17   Ht 1.753 m (5' 9\")   Wt 87.1 kg (192 lb)   SpO2 98%   BMI 28.35 kg/m²   Blood pressure 120/70, pulse 63, resp. rate 17, height 1.753 m (5' 9\"), weight 87.1 kg (192 lb), SpO2 98%.   Physical Exam  Vitals and nursing note reviewed.   Constitutional:       Appearance: Normal appearance.   HENT:      Head: Normocephalic and atraumatic.      Right Ear: Tympanic membrane, ear canal and external ear normal.      Left Ear: Tympanic membrane, ear canal and external ear normal. There is no impacted cerumen.      Nose: Nose normal.      Mouth/Throat:      Mouth: Mucous membranes are moist.      Pharynx: Oropharynx is clear.   Eyes:      Extraocular Movements: Extraocular movements intact.      Conjunctiva/sclera: Conjunctivae normal.      Pupils: Pupils are equal, round, and reactive to light.   Cardiovascular:      Rate and Rhythm: Normal rate and regular rhythm.      Pulses: Normal pulses.      Heart sounds: Normal heart sounds. No murmur heard.  Pulmonary:      Effort: Pulmonary effort is normal. No respiratory distress.      Breath sounds: Normal breath sounds. No stridor. No wheezing, rhonchi or rales.   Chest:      Chest wall: No tenderness.   Abdominal:      General: Abdomen is flat. Bowel sounds are normal. There is no distension.      Palpations: Abdomen is soft. There is no mass.      Tenderness: There is no abdominal tenderness. There is no right " CVA tenderness, left CVA tenderness, guarding or rebound.      Hernia: No hernia is present.   Musculoskeletal:         General: Normal range of motion.      Cervical back: Normal range of motion and neck supple.   Skin:     General: Skin is warm.      Capillary Refill: Capillary refill takes less than 2 seconds.   Neurological:      General: No focal deficit present.      Mental Status: He is alert.      Cranial Nerves: No cranial nerve deficit.      Sensory: No sensory deficit.      Motor: No weakness.      Coordination: Coordination normal.      Gait: Gait normal.      Deep Tendon Reflexes: Reflexes normal.   Psychiatric:         Mood and Affect: Mood normal.         Behavior: Behavior normal. Behavior is cooperative.         Thought Content: Thought content normal.         Judgment: Judgment normal.         Assessment/Plan   Problem List Items Addressed This Visit             ICD-10-CM    Benign essential hypertension - Primary I10     HTN - hypertension well/controlled .Target BP < 130/80 achieved. Educate low salt diet and exercise with weight loss. Educate home self monitoring and diary keeping. Educate risks of elevate blood pressure and benefits of prompt treatment. Refill          Degenerative disc disease, lumbar M51.36     DDD - Degenerative disc disease of the Lumbo-Sacral (LS)/Cervical (C) spine. Educate exercises and referred patient to Physical Therapy (PT). Ordered X-Ray's of the LS/C spine. Consider MRI. Radiculopathy in the distribution of L4-L5-S1/C3-C4-C5 nerve roots, needs NSAIDS (Naprosin 500mg BID vs. Arthrotec 75mg BID or Prednisone taper (Medrol dose pack), Flexeril 10 mg qHS, heat application, and pain control with Tylenol          Elevated LDL cholesterol level E78.00     Hypercholesterolemia - Monitor lipid profile and educate patient upon risks of high cholesterol and targets. Educate diet and change in lifestyle and increase in exercises - Refill: Atorvastatin  and educate compliance  with medication and diet.                Thyroid goiter E04.9     Thyroid goiter and follows with Dr. Abreu surgery and stable          Neurogenic bladder N31.9     Neurogenic Bladder. Rec. U/A and cultures and start empiric antibiotic therapy : Cipro 500 BID for 5 days and recheck urine analysis (U/A) at the end of the therapy to check effectiveness of the therapy. Treat neurogenic bladder: Hyperactive bladder - with Detrol 4 mg daily, or Vesicare 5 mg / 10 mg daily, Oxybutinin 5mg/ 10mg daily,  educate bladder training exercises(Keggel exercises) and change in lifestyle(reduce fluid intake for 4 hours before bed time).          Prostatic adenocarcinoma (Multi) C61     Monitor PSA and Asymptomatic   - and feels well and on Casodex                   Benign essential hypertension I10        HTN - hypertension well/controlled .Target BP < 130/80 achieved. Educate low salt diet and exercise with weight loss. Educate home self monitoring and diary keeping. Educate risks of elevate blood pressure and benefits of prompt treatment. Refill            Elevated LDL cholesterol level E78.00       Daniel Ortiz MD1/22/2024 3:28 PM     Hypercholesterolemia - Monitor lipid profile and educate patient upon risks of high cholesterol and targets. Educate diet and change in lifestyle and increase in exercises - Refill: Atorvastatin  and educate compliance with medication and diet.                  Lumbar disc disease with radiculopathy M51.16       DDD - Degenerative disc disease of the Lumbo-Sacral (LS)/spine. Educate exercises and referred patient to Physical Therapy (PT). Ordered X-Ray's of the LS/ spine. Consider MRI. Radiculopathy in the distribution of L4-L5-S1/ nerve roots, needs NSAIDS (Naprosin 500mg BID vs. Arthrotec 75mg BID or Prednisone taper (Medrol dose pack), Flexeril 10 mg qHS, heat application, and pain control with Tylenol             Thyroid goiter E04.9       Thyroid goiter and follows with Dr. Abreu  surgery and stable            Other chest pain - Primary R07.89       CP - Chest Pain, atypical/angina, EKG with no significant ischemic changes. Referred patient to stress test. Risk factors includes: HTN, Hypercholesterolemia, age, family history,                                                                              Control risk factors and educate diet and life style changes.                                                                                                                   Relevant Orders     Nuclear Stress Test      Bradycardia = Asymptomatic  and EKG shows no ischemia  - we will follow and get a stress test in light of his chest pain          Benign essential hypertension I10           HTN - hypertension well/controlled .Target BP < 130/80  achieved. Educate low salt diet and exercise with weight loss. Educate home self monitoring and diary keeping. Educate risks of elevate blood pressure and benefits of prompt treatment.  Refill            Relevant Orders       Comprehensive Metabolic Panel       Elevated LDL cholesterol level E78.00         Hypercholesterolemia - Monitor lipid profile and educate patient upon risks of high cholesterol and targets. Educate diet and change in lifestyle and increase in exercises - Refill: Atorvastatin  and educate compliance with medication and diet.              Relevant Orders       Lipid Panel       Lumbar disc disease with radiculopathy M51.16         DDD - Degenerative disc disease of the Lumbo-Sacral (LS)/spine. Educate exercises and referred patient to Physical Therapy (PT). Ordered X-Ray's of the LS/ spine. Consider MRI. Radiculopathy in the distribution of L4-L5-S1/ nerve roots, needs NSAIDS (Naprosin 500mg BID vs. Arthrotec 75mg BID or Prednisone taper (Medrol dose pack), Flexeril 10 mg qHS, heat application, and pain control with Tylenol             Neurogenic bladder N31.9       Multiple thyroid nodules E04.2         .goiter and thyroid nodule  and check US and function  and follows with surgery            Relevant Orders       TSH with reflex to Free T4 if abnormal                               Immunizations/Injections       very important  Immunizations from outside sources need reconciliation.       Influenza, High Dose Seasonal, Preservative Free10/10/2022, 10/8/2021, 10/5/2020,  ... (5 more)  Influenza, Seasonal, Quadrivalent, Adjuvanted8/31/2023  Influenza, Rqsdjlwczov95/10/2022, 10/8/2021, 11/19/2010  Influenza, seasonal, fjevslzuxs05/4/2013, 10/30/2012  Moderna COVID-19 vaccine, Fall 2023, 12 yeasrs and older (50mcg/0.5mL)9/30/2023  Moderna COVID-19 vaccine, bivalent, blue cap/gray label *Check age/dose*9/18/2022  Moderna SARS-CoV-2 Vaccination2/12/2022, 8/15/2021, 3/4/2021,  ... (1 more)  Pneumococcal conjugate vaccine, 13-valent (PREVNAR 13)9/17/2015  Pneumococcal conjugate vaccine, 20-valent (PREVNAR 20)8/31/2023  Pneumococcal polysaccharide vaccine, 23-valent, age 2 years and older (PNEUMOVAX 23)5/9/2021, 12/22/2014  Td vaccine, age 7 years and older (TDVAX)8/21/2009  Zoster vaccine, recombinant, adult (SHINGRIX)5/9/2021, 3/5/2019  Zoster, live9/14/2011                     Immunizations/Injections       very important  Immunizations from outside sources need reconciliation.       Influenza, High Dose Seasonal, Preservative Free10/10/2022, 10/8/2021, 10/5/2020,  ... (5 more)  Influenza, Seasonal, Quadrivalent, Adjuvanted8/31/2023  Influenza, Zwyqlcvgcga97/10/2022, 10/8/2021, 11/19/2010  Influenza, seasonal, rzfbourlgl56/4/2013, 10/30/2012  Moderna COVID-19 vaccine, Fall 2023, 12 yeasrs and older (50mcg/0.5mL)9/30/2023  Moderna COVID-19 vaccine, bivalent, blue cap/gray label *Check age/dose*9/18/2022  Moderna SARS-CoV-2 Vaccination2/12/2022, 8/15/2021, 3/4/2021,  ... (1 more)  Pneumococcal conjugate vaccine, 13-valent (PREVNAR 13)9/17/2015  Pneumococcal conjugate vaccine, 20-valent (PREVNAR 20)8/31/2023  Pneumococcal polysaccharide vaccine,  23-valent, age 2 years and older (PNEUMOVAX 23)5/9/2021, 12/22/2014  Td vaccine, age 7 years and older (TDVAX)8/21/2009  Zoster vaccine, recombinant, adult (SHINGRIX)5/9/2021, 3/5/2019  Zoster, live9/14/2011                Immunizations/Injections      very important  Immunizations from outside sources need reconciliation.      Influenza, High Dose Seasonal, Preservative Free10/10/2022, 10/8/2021, 10/5/2020,  ... (5 more)  Influenza, Seasonal, Quadrivalent, Adjuvanted8/31/2023  Influenza, Iruksyiumeo44/10/2022, 10/8/2021, 11/19/2010  Influenza, seasonal, gyzagpxnwb23/4/2013, 10/30/2012  Moderna COVID-19 vaccine, Fall 2023, 12 yeasrs and older (50mcg/0.5mL)9/30/2023  Moderna COVID-19 vaccine, bivalent, blue cap/gray label *Check age/dose*9/18/2022  Moderna SARS-CoV-2 Vaccination2/12/2022, 8/15/2021, 3/4/2021,  ... (1 more)  Pneumococcal conjugate vaccine, 13-valent (PREVNAR 13)9/17/2015  Pneumococcal conjugate vaccine, 20-valent (PREVNAR 20)8/31/2023  Pneumococcal polysaccharide vaccine, 23-valent, age 2 years and older (PNEUMOVAX 23)5/9/2021, 12/22/2014  Td vaccine, age 7 years and older (TDVAX)8/21/2009  Zoster vaccine, recombinant, adult (SHINGRIX)5/9/2021, 3/5/2019  Zoster, live9/14/2011

## 2024-06-17 NOTE — ASSESSMENT & PLAN NOTE
DDD - Degenerative disc disease of the Lumbo-Sacral (LS)/Cervical (C) spine. Educate exercises and referred patient to Physical Therapy (PT). Ordered X-Ray's of the LS/C spine. Consider MRI. Radiculopathy in the distribution of L4-L5-S1/C3-C4-C5 nerve roots, needs NSAIDS (Naprosin 500mg BID vs. Arthrotec 75mg BID or Prednisone taper (Medrol dose pack), Flexeril 10 mg qHS, heat application, and pain control with Tylenol

## 2024-06-17 NOTE — ASSESSMENT & PLAN NOTE
Neurogenic Bladder. Rec. U/A and cultures and start empiric antibiotic therapy : Cipro 500 BID for 5 days and recheck urine analysis (U/A) at the end of the therapy to check effectiveness of the therapy. Treat neurogenic bladder: Hyperactive bladder - with Detrol 4 mg daily, or Vesicare 5 mg / 10 mg daily, Oxybutinin 5mg/ 10mg daily,  educate bladder training exercises(Keggel exercises) and change in lifestyle(reduce fluid intake for 4 hours before bed time).

## 2024-06-17 NOTE — ASSESSMENT & PLAN NOTE
Hypercholesterolemia - Monitor lipid profile and educate patient upon risks of high cholesterol and targets. Educate diet and change in lifestyle and increase in exercises - Refill: Atorvastatin  and educate compliance with medication and diet.

## 2024-07-17 ENCOUNTER — LAB (OUTPATIENT)
Dept: LAB | Facility: LAB | Age: 74
End: 2024-07-17
Payer: MEDICARE

## 2024-07-17 DIAGNOSIS — C61 MALIGNANT NEOPLASM OF PROSTATE (MULTI): Primary | ICD-10-CM

## 2024-07-17 PROCEDURE — 36415 COLL VENOUS BLD VENIPUNCTURE: CPT

## 2024-07-17 PROCEDURE — 84153 ASSAY OF PSA TOTAL: CPT

## 2024-07-17 PROCEDURE — 84154 ASSAY OF PSA FREE: CPT

## 2024-07-19 LAB
PSA FREE MFR SERPL: NORMAL %
PSA FREE SERPL-MCNC: <0.1 NG/ML
PSA SERPL IA-MCNC: <0.1 NG/ML (ref 0–4)

## 2024-09-01 DIAGNOSIS — M10.9 GOUT, ARTHROPATHY: ICD-10-CM

## 2024-09-03 RX ORDER — ALLOPURINOL 100 MG/1
100 TABLET ORAL DAILY
Qty: 90 TABLET | Refills: 1 | Status: SHIPPED | OUTPATIENT
Start: 2024-09-03

## 2024-09-23 ENCOUNTER — LAB (OUTPATIENT)
Dept: LAB | Facility: LAB | Age: 74
End: 2024-09-23
Payer: MEDICARE

## 2024-09-23 ENCOUNTER — APPOINTMENT (OUTPATIENT)
Dept: PRIMARY CARE | Facility: CLINIC | Age: 74
End: 2024-09-23
Payer: MEDICARE

## 2024-09-23 VITALS
OXYGEN SATURATION: 96 % | BODY MASS INDEX: 27.99 KG/M2 | SYSTOLIC BLOOD PRESSURE: 118 MMHG | HEART RATE: 63 BPM | DIASTOLIC BLOOD PRESSURE: 65 MMHG | RESPIRATION RATE: 17 BRPM | WEIGHT: 189 LBS | HEIGHT: 69 IN

## 2024-09-23 DIAGNOSIS — E78.00 ELEVATED LDL CHOLESTEROL LEVEL: ICD-10-CM

## 2024-09-23 DIAGNOSIS — I10 BENIGN ESSENTIAL HYPERTENSION: ICD-10-CM

## 2024-09-23 DIAGNOSIS — C61 PROSTATIC ADENOCARCINOMA (MULTI): ICD-10-CM

## 2024-09-23 DIAGNOSIS — M10.9 GOUT, ARTHROPATHY: ICD-10-CM

## 2024-09-23 DIAGNOSIS — E04.9 THYROID GOITER: ICD-10-CM

## 2024-09-23 DIAGNOSIS — R53.83 OTHER FATIGUE: ICD-10-CM

## 2024-09-23 DIAGNOSIS — M54.16 LUMBAR BACK PAIN WITH RADICULOPATHY AFFECTING RIGHT LOWER EXTREMITY: Primary | ICD-10-CM

## 2024-09-23 LAB
ALBUMIN SERPL BCP-MCNC: 4.2 G/DL (ref 3.4–5)
ALP SERPL-CCNC: 63 U/L (ref 33–136)
ALT SERPL W P-5'-P-CCNC: 17 U/L (ref 10–52)
ANION GAP SERPL CALC-SCNC: 11 MMOL/L (ref 10–20)
AST SERPL W P-5'-P-CCNC: 16 U/L (ref 9–39)
BASOPHILS # BLD AUTO: 0.02 X10*3/UL (ref 0–0.1)
BASOPHILS NFR BLD AUTO: 0.5 %
BILIRUB SERPL-MCNC: 0.8 MG/DL (ref 0–1.2)
BUN SERPL-MCNC: 14 MG/DL (ref 6–23)
CALCIUM SERPL-MCNC: 9.7 MG/DL (ref 8.6–10.6)
CHLORIDE SERPL-SCNC: 106 MMOL/L (ref 98–107)
CHOLEST SERPL-MCNC: 153 MG/DL (ref 0–199)
CHOLESTEROL/HDL RATIO: 2.6
CO2 SERPL-SCNC: 29 MMOL/L (ref 21–32)
CREAT SERPL-MCNC: 0.8 MG/DL (ref 0.5–1.3)
EGFRCR SERPLBLD CKD-EPI 2021: >90 ML/MIN/1.73M*2
EOSINOPHIL # BLD AUTO: 0.19 X10*3/UL (ref 0–0.4)
EOSINOPHIL NFR BLD AUTO: 4.9 %
ERYTHROCYTE [DISTWIDTH] IN BLOOD BY AUTOMATED COUNT: 14.4 % (ref 11.5–14.5)
GLUCOSE SERPL-MCNC: 92 MG/DL (ref 74–99)
HCT VFR BLD AUTO: 41.1 % (ref 41–52)
HDLC SERPL-MCNC: 58.1 MG/DL
HGB BLD-MCNC: 13.3 G/DL (ref 13.5–17.5)
IMM GRANULOCYTES # BLD AUTO: 0 X10*3/UL (ref 0–0.5)
IMM GRANULOCYTES NFR BLD AUTO: 0 % (ref 0–0.9)
LDLC SERPL CALC-MCNC: 86 MG/DL
LYMPHOCYTES # BLD AUTO: 1.39 X10*3/UL (ref 0.8–3)
LYMPHOCYTES NFR BLD AUTO: 35.5 %
MCH RBC QN AUTO: 28.5 PG (ref 26–34)
MCHC RBC AUTO-ENTMCNC: 32.4 G/DL (ref 32–36)
MCV RBC AUTO: 88 FL (ref 80–100)
MONOCYTES # BLD AUTO: 0.4 X10*3/UL (ref 0.05–0.8)
MONOCYTES NFR BLD AUTO: 10.2 %
NEUTROPHILS # BLD AUTO: 1.91 X10*3/UL (ref 1.6–5.5)
NEUTROPHILS NFR BLD AUTO: 48.9 %
NON HDL CHOLESTEROL: 95 MG/DL (ref 0–149)
NRBC BLD-RTO: 0 /100 WBCS (ref 0–0)
PLATELET # BLD AUTO: 265 X10*3/UL (ref 150–450)
POTASSIUM SERPL-SCNC: 4.1 MMOL/L (ref 3.5–5.3)
PROT SERPL-MCNC: 7 G/DL (ref 6.4–8.2)
RBC # BLD AUTO: 4.67 X10*6/UL (ref 4.5–5.9)
SODIUM SERPL-SCNC: 142 MMOL/L (ref 136–145)
TRIGL SERPL-MCNC: 45 MG/DL (ref 0–149)
TSH SERPL-ACNC: 0.8 MIU/L (ref 0.44–3.98)
VLDL: 9 MG/DL (ref 0–40)
WBC # BLD AUTO: 3.9 X10*3/UL (ref 4.4–11.3)

## 2024-09-23 PROCEDURE — 3074F SYST BP LT 130 MM HG: CPT | Performed by: INTERNAL MEDICINE

## 2024-09-23 PROCEDURE — 99214 OFFICE O/P EST MOD 30 MIN: CPT | Performed by: INTERNAL MEDICINE

## 2024-09-23 PROCEDURE — 80053 COMPREHEN METABOLIC PANEL: CPT

## 2024-09-23 PROCEDURE — 80061 LIPID PANEL: CPT

## 2024-09-23 PROCEDURE — 84443 ASSAY THYROID STIM HORMONE: CPT

## 2024-09-23 PROCEDURE — 36415 COLL VENOUS BLD VENIPUNCTURE: CPT

## 2024-09-23 PROCEDURE — 3078F DIAST BP <80 MM HG: CPT | Performed by: INTERNAL MEDICINE

## 2024-09-23 PROCEDURE — 1159F MED LIST DOCD IN RCRD: CPT | Performed by: INTERNAL MEDICINE

## 2024-09-23 PROCEDURE — 85025 COMPLETE CBC W/AUTO DIFF WBC: CPT

## 2024-09-23 PROCEDURE — 1036F TOBACCO NON-USER: CPT | Performed by: INTERNAL MEDICINE

## 2024-09-23 RX ORDER — ATORVASTATIN CALCIUM 10 MG/1
10 TABLET, FILM COATED ORAL DAILY
Qty: 90 TABLET | Refills: 1 | Status: SHIPPED | OUTPATIENT
Start: 2024-09-23

## 2024-09-23 RX ORDER — ALLOPURINOL 100 MG/1
100 TABLET ORAL DAILY
Qty: 90 TABLET | Refills: 1 | Status: SHIPPED | OUTPATIENT
Start: 2024-09-23

## 2024-09-23 ASSESSMENT — ENCOUNTER SYMPTOMS
CARDIOVASCULAR NEGATIVE: 1
CONSTITUTIONAL NEGATIVE: 1
EYES NEGATIVE: 1
PSYCHIATRIC NEGATIVE: 1
GASTROINTESTINAL NEGATIVE: 1
ENDOCRINE NEGATIVE: 1
RESPIRATORY NEGATIVE: 1
NEUROLOGICAL NEGATIVE: 1
ALLERGIC/IMMUNOLOGIC NEGATIVE: 1
MUSCULOSKELETAL NEGATIVE: 1
HEMATOLOGIC/LYMPHATIC NEGATIVE: 1

## 2024-09-23 NOTE — PROGRESS NOTES
"Subjective   Patient ID: Gee Hardin is a 74 y.o. male who presents for Follow-up (Follow up).    HPI     Review of Systems   Constitutional: Negative.    HENT: Negative.     Eyes: Negative.    Respiratory: Negative.     Cardiovascular: Negative.    Gastrointestinal: Negative.    Endocrine: Negative.    Musculoskeletal: Negative.    Skin: Negative.    Allergic/Immunologic: Negative.    Neurological: Negative.    Hematological: Negative.    Psychiatric/Behavioral: Negative.     All other systems reviewed and are negative.      Objective   Pulse 63   Resp 17   Ht 1.753 m (5' 9\")   Wt 85.7 kg (189 lb)   SpO2 96%   BMI 27.91 kg/m²   Blood pressure 118/65, pulse 63, resp. rate 17, height 1.753 m (5' 9\"), weight 85.7 kg (189 lb), SpO2 96%.   Physical Exam  Vitals and nursing note reviewed.   Constitutional:       Appearance: Normal appearance.   HENT:      Head: Normocephalic and atraumatic.      Right Ear: Tympanic membrane, ear canal and external ear normal.      Left Ear: Tympanic membrane, ear canal and external ear normal. There is no impacted cerumen.      Nose: Nose normal.      Mouth/Throat:      Mouth: Mucous membranes are moist.      Pharynx: Oropharynx is clear.   Eyes:      Extraocular Movements: Extraocular movements intact.      Conjunctiva/sclera: Conjunctivae normal.      Pupils: Pupils are equal, round, and reactive to light.   Cardiovascular:      Rate and Rhythm: Normal rate and regular rhythm.      Pulses: Normal pulses.      Heart sounds: Normal heart sounds. No murmur heard.  Pulmonary:      Effort: Pulmonary effort is normal. No respiratory distress.      Breath sounds: Normal breath sounds. No stridor. No wheezing, rhonchi or rales.   Chest:      Chest wall: No tenderness.   Abdominal:      General: Abdomen is flat. Bowel sounds are normal. There is no distension.      Palpations: Abdomen is soft. There is no mass.      Tenderness: There is no abdominal tenderness. There is no right CVA " tenderness, left CVA tenderness, guarding or rebound.      Hernia: No hernia is present.   Musculoskeletal:         General: Normal range of motion.      Cervical back: Normal range of motion and neck supple.   Skin:     General: Skin is warm.      Capillary Refill: Capillary refill takes less than 2 seconds.   Neurological:      General: No focal deficit present.      Mental Status: He is alert.      Cranial Nerves: No cranial nerve deficit.      Sensory: No sensory deficit.      Motor: No weakness.      Coordination: Coordination normal.      Gait: Gait normal.      Deep Tendon Reflexes: Reflexes normal.   Psychiatric:         Mood and Affect: Mood normal.         Behavior: Behavior normal. Behavior is cooperative.         Thought Content: Thought content normal.         Judgment: Judgment normal.         Assessment/Plan   Problem List Items Addressed This Visit             ICD-10-CM    Benign essential hypertension I10     HTN - hypertension well/controlled .Target BP < 130/80 achieved. Educate low salt diet and exercise with weight loss. Educate home self monitoring and diary keeping. Educate risks of elevate blood pressure and benefits of prompt treatment. Refill          Elevated LDL cholesterol level E78.00     Hypercholesterolemia - Monitor lipid profile and educate patient upon risks of high cholesterol and targets. Educate diet and change in lifestyle and increase in exercises - Refill: Atorvastatin  and educate compliance with medication and diet.                Relevant Medications    atorvastatin (Lipitor) 10 mg tablet    Gout, arthropathy M10.9    Relevant Medications    allopurinol (Zyloprim) 100 mg tablet    Thyroid goiter E04.9     Thyroid goiter and follows with Dr. Abreu surgery and stable          Lumbar back pain with radiculopathy affecting right lower extremity - Primary M54.16     DDD - Degenerative disc disease of the Lumbo-Sacral (LS)/  spine. Educate exercises and referred patient to  Physical Therapy (PT). Ordered X-Ray's of the LS spine. Consider MRI. Radiculopathy in the distribution of L4-L5-S1/ nerve roots, needs NSAIDS (Naprosin 500mg BID vs. Arthrotec 75mg BID or Prednisone taper (Medrol dose pack), Flexeril 10 mg qHS, heat application, and pain control with Tylenol          Prostatic adenocarcinoma (Multi) C61     Monitor PSA and Asymptomatic   - and feels well and on Casodex                   Benign essential hypertension - Primary I10        HTN - hypertension well/controlled .Target BP < 130/80 achieved. Educate low salt diet and exercise with weight loss. Educate home self monitoring and diary keeping. Educate risks of elevate blood pressure and benefits of prompt treatment. Refill            Degenerative disc disease, lumbar M51.36       DDD - Degenerative disc disease of the Lumbo-Sacral (LS)/Cervical (C) spine. Educate exercises and referred patient to Physical Therapy (PT). Ordered X-Ray's of the LS/C spine. Consider MRI. Radiculopathy in the distribution of L4-L5-S1/C3-C4-C5 nerve roots, needs NSAIDS (Naprosin 500mg BID vs. Arthrotec 75mg BID or Prednisone taper (Medrol dose pack), Flexeril 10 mg qHS, heat application, and pain control with Tylenol            Elevated LDL cholesterol level E78.00       Hypercholesterolemia - Monitor lipid profile and educate patient upon risks of high cholesterol and targets. Educate diet and change in lifestyle and increase in exercises - Refill: Atorvastatin  and educate compliance with medication and diet.                  Thyroid goiter E04.9       Thyroid goiter and follows with Dr. Abreu surgery and stable            Neurogenic bladder N31.9       Neurogenic Bladder. Rec. U/A and cultures and start empiric antibiotic therapy : Cipro 500 BID for 5 days and recheck urine analysis (U/A) at the end of the therapy to check effectiveness of the therapy. Treat neurogenic bladder: Hyperactive bladder - with Detrol 4 mg daily, or Vesicare 5 mg /  10 mg daily, Oxybutinin 5mg/ 10mg daily,  educate bladder training exercises(Keggel exercises) and change in lifestyle(reduce fluid intake for 4 hours before bed time).            Prostatic adenocarcinoma (Multi) C61       Monitor PSA and Asymptomatic   - and feels well and on Casodex                           Benign essential hypertension I10           HTN - hypertension well/controlled .Target BP < 130/80 achieved. Educate low salt diet and exercise with weight loss. Educate home self monitoring and diary keeping. Educate risks of elevate blood pressure and benefits of prompt treatment. Refill             Elevated LDL cholesterol level E78.00         Daniel Ortiz MD1/22/2024 3:28 PM     Hypercholesterolemia - Monitor lipid profile and educate patient upon risks of high cholesterol and targets. Educate diet and change in lifestyle and increase in exercises - Refill: Atorvastatin  and educate compliance with medication and diet.                  Lumbar disc disease with radiculopathy M51.16         DDD - Degenerative disc disease of the Lumbo-Sacral (LS)/spine. Educate exercises and referred patient to Physical Therapy (PT). Ordered X-Ray's of the LS/ spine. Consider MRI. Radiculopathy in the distribution of L4-L5-S1/ nerve roots, needs NSAIDS (Naprosin 500mg BID vs. Arthrotec 75mg BID or Prednisone taper (Medrol dose pack), Flexeril 10 mg qHS, heat application, and pain control with Tylenol              Thyroid goiter E04.9         Thyroid goiter and follows with Dr. Abreu surgery and stable             Other chest pain - Primary R07.89         CP - Chest Pain, atypical/angina, EKG with no significant ischemic changes. Referred patient to stress test. Risk factors includes: HTN, Hypercholesterolemia, age, family history,                                                                              Control risk factors and educate diet and life style changes.                                                                                                                     Relevant Orders       Nuclear Stress Test        Bradycardia = Asymptomatic  and EKG shows no ischemia  - we will follow and get a stress test in light of his chest pain               Benign essential hypertension I10           HTN - hypertension well/controlled .Target BP < 130/80  achieved. Educate low salt diet and exercise with weight loss. Educate home self monitoring and diary keeping. Educate risks of elevate blood pressure and benefits of prompt treatment.  Refill            Relevant Orders       Comprehensive Metabolic Panel       Elevated LDL cholesterol level E78.00         Hypercholesterolemia - Monitor lipid profile and educate patient upon risks of high cholesterol and targets. Educate diet and change in lifestyle and increase in exercises - Refill: Atorvastatin  and educate compliance with medication and diet.              Relevant Orders       Lipid Panel       Lumbar disc disease with radiculopathy M51.16         DDD - Degenerative disc disease of the Lumbo-Sacral (LS)/spine. Educate exercises and referred patient to Physical Therapy (PT). Ordered X-Ray's of the LS/ spine. Consider MRI. Radiculopathy in the distribution of L4-L5-S1/ nerve roots, needs NSAIDS (Naprosin 500mg BID vs. Arthrotec 75mg BID or Prednisone taper (Medrol dose pack), Flexeril 10 mg qHS, heat application, and pain control with Tylenol             Neurogenic bladder N31.9       Multiple thyroid nodules E04.2         .goiter and thyroid nodule and check US and function  and follows with surgery            Relevant Orders       TSH with reflex to Free T4 if abnormal                               Immunizations/Injections       very important  Immunizations from outside sources need reconciliation.       Influenza, High Dose Seasonal, Preservative Free10/10/2022, 10/8/2021, 10/5/2020,  ... (5 more)  Influenza, Seasonal, Quadrivalent, Adjuvanted8/31/2023  Influenza,  Wplvgqwiuku29/10/2022, 10/8/2021, 11/19/2010  Influenza, seasonal, qfczvagdnv30/4/2013, 10/30/2012  Moderna COVID-19 vaccine, Fall 2023, 12 yeasrs and older (50mcg/0.5mL)9/30/2023  Moderna COVID-19 vaccine, bivalent, blue cap/gray label *Check age/dose*9/18/2022  Moderna SARS-CoV-2 Vaccination2/12/2022, 8/15/2021, 3/4/2021,  ... (1 more)  Pneumococcal conjugate vaccine, 13-valent (PREVNAR 13)9/17/2015  Pneumococcal conjugate vaccine, 20-valent (PREVNAR 20)8/31/2023  Pneumococcal polysaccharide vaccine, 23-valent, age 2 years and older (PNEUMOVAX 23)5/9/2021, 12/22/2014  Td vaccine, age 7 years and older (TDVAX)8/21/2009  Zoster vaccine, recombinant, adult (SHINGRIX)5/9/2021, 3/5/2019  Zoster, live9/14/2011                     Immunizations/Injections       very important  Immunizations from outside sources need reconciliation.       Influenza, High Dose Seasonal, Preservative Free10/10/2022, 10/8/2021, 10/5/2020,  ... (5 more)  Influenza, Seasonal, Quadrivalent, Adjuvanted8/31/2023  Influenza, Zxvisontdow59/10/2022, 10/8/2021, 11/19/2010  Influenza, seasonal, ufmichtzgn89/4/2013, 10/30/2012  Moderna COVID-19 vaccine, Fall 2023, 12 yeasrs and older (50mcg/0.5mL)9/30/2023  Moderna COVID-19 vaccine, bivalent, blue cap/gray label *Check age/dose*9/18/2022  Moderna SARS-CoV-2 Vaccination2/12/2022, 8/15/2021, 3/4/2021,  ... (1 more)  Pneumococcal conjugate vaccine, 13-valent (PREVNAR 13)9/17/2015  Pneumococcal conjugate vaccine, 20-valent (PREVNAR 20)8/31/2023  Pneumococcal polysaccharide vaccine, 23-valent, age 2 years and older (PNEUMOVAX 23)5/9/2021, 12/22/2014  Td vaccine, age 7 years and older (TDVAX)8/21/2009  Zoster vaccine, recombinant, adult (SHINGRIX)5/9/2021, 3/5/2019  Zoster, live9/14/2011                  Immunizations/Injections       very important  Immunizations from outside sources need reconciliation.       Influenza, High Dose Seasonal, Preservative Free10/10/2022, 10/8/2021, 10/5/2020,  ... (5  more)  Influenza, Seasonal, Quadrivalent, Adjuvanted8/31/2023  Influenza, Isezctpzdlw34/10/2022, 10/8/2021, 11/19/2010  Influenza, seasonal, lvkgbjmvay81/4/2013, 10/30/2012  Moderna COVID-19 vaccine, Fall 2023, 12 yeasrs and older (50mcg/0.5mL)9/30/2023  Moderna COVID-19 vaccine, bivalent, blue cap/gray label *Check age/dose*9/18/2022  Moderna SARS-CoV-2 Vaccination2/12/2022, 8/15/2021, 3/4/2021,  ... (1 more)  Pneumococcal conjugate vaccine, 13-valent (PREVNAR 13)9/17/2015  Pneumococcal conjugate vaccine, 20-valent (PREVNAR 20)8/31/2023  Pneumococcal polysaccharide vaccine, 23-valent, age 2 years and older (PNEUMOVAX 23)5/9/2021, 12/22/2014  Td vaccine, age 7 years and older (TDVAX)8/21/2009  Zoster vaccine, recombinant, adult (SHINGRIX)5/9/2021, 3/5/2019  Zoster, live9/14/2011                Immunizations/Injections      very important  Immunizations from outside sources need reconciliation.      Flu vaccine, trivalent, preservative free, HIGH-DOSE, age 65y+ (Fluzone)10/10/2022, 10/8/2021, 10/5/2020,  ... (5 more)  Influenza, Seasonal, Quadrivalent, Adjuvanted8/31/2023  Influenza, Rhqxzvqhmmn40/10/2022, 10/8/2021, 11/19/2010  Influenza, seasonal, mlapbmhmid27/4/2013, 10/30/2012  Moderna COVID-19 vaccine, 12 years and older (50mcg/0.5mL)(Spikevax)9/30/2023  Moderna COVID-19 vaccine, bivalent, blue cap/gray label *Check age/dose*9/18/2022  Moderna SARS-CoV-2 Vaccination2/12/2022, 8/15/2021, 3/4/2021,  ... (1 more)  Pneumococcal conjugate vaccine, 13-valent (PREVNAR 13)9/17/2015  Pneumococcal conjugate vaccine, 20-valent (PREVNAR 20)8/31/2023  Pneumococcal polysaccharide vaccine, 23-valent, age 2 years and older (PNEUMOVAX 23)5/9/2021, 12/22/2014  Td vaccine, age 7 years and older (TDVAX)8/21/2009  Zoster vaccine, recombinant, adult (SHINGRIX)5/9/2021, 3/5/2019  Zoster, live9/14/2011

## 2024-09-23 NOTE — ASSESSMENT & PLAN NOTE
DDD - Degenerative disc disease of the Lumbo-Sacral (LS)/  spine. Educate exercises and referred patient to Physical Therapy (PT). Ordered X-Ray's of the LS spine. Consider MRI. Radiculopathy in the distribution of L4-L5-S1/ nerve roots, needs NSAIDS (Naprosin 500mg BID vs. Arthrotec 75mg BID or Prednisone taper (Medrol dose pack), Flexeril 10 mg qHS, heat application, and pain control with Tylenol

## 2024-11-21 ENCOUNTER — LAB (OUTPATIENT)
Dept: LAB | Facility: LAB | Age: 74
End: 2024-11-21
Payer: MEDICARE

## 2024-11-21 DIAGNOSIS — C61 MALIGNANT NEOPLASM OF PROSTATE (MULTI): Primary | ICD-10-CM

## 2024-11-21 PROCEDURE — 84402 ASSAY OF FREE TESTOSTERONE: CPT

## 2024-11-21 PROCEDURE — 84154 ASSAY OF PSA FREE: CPT

## 2024-11-21 PROCEDURE — 36415 COLL VENOUS BLD VENIPUNCTURE: CPT

## 2024-11-21 PROCEDURE — 84153 ASSAY OF PSA TOTAL: CPT

## 2024-11-29 LAB
TESTOSTERONE FREE (CHAN): 0.7 PG/ML (ref 30–135)
TESTOSTERONE,TOTAL,LC-MS/MS: 9 NG/DL (ref 250–1100)

## 2024-12-16 ENCOUNTER — APPOINTMENT (OUTPATIENT)
Dept: PRIMARY CARE | Facility: CLINIC | Age: 74
End: 2024-12-16
Payer: MEDICARE

## 2024-12-16 ENCOUNTER — LAB (OUTPATIENT)
Dept: LAB | Facility: LAB | Age: 74
End: 2024-12-16
Payer: MEDICARE

## 2024-12-16 VITALS
DIASTOLIC BLOOD PRESSURE: 80 MMHG | SYSTOLIC BLOOD PRESSURE: 120 MMHG | HEIGHT: 69 IN | WEIGHT: 188 LBS | RESPIRATION RATE: 21 BRPM | BODY MASS INDEX: 27.85 KG/M2 | OXYGEN SATURATION: 98 % | HEART RATE: 59 BPM

## 2024-12-16 DIAGNOSIS — M54.16 LUMBAR BACK PAIN WITH RADICULOPATHY AFFECTING RIGHT LOWER EXTREMITY: ICD-10-CM

## 2024-12-16 DIAGNOSIS — S13.4XXA WHIPLASH INJURY TO NECK, INITIAL ENCOUNTER: ICD-10-CM

## 2024-12-16 DIAGNOSIS — R73.9 HYPERGLYCEMIA: ICD-10-CM

## 2024-12-16 DIAGNOSIS — Z00.00 MEDICARE ANNUAL WELLNESS VISIT, SUBSEQUENT: Primary | ICD-10-CM

## 2024-12-16 DIAGNOSIS — E04.9 THYROID GOITER: ICD-10-CM

## 2024-12-16 DIAGNOSIS — D53.1 MEGALOBLASTIC ANEMIA: ICD-10-CM

## 2024-12-16 DIAGNOSIS — C61 PROSTATIC ADENOCARCINOMA (MULTI): ICD-10-CM

## 2024-12-16 DIAGNOSIS — M17.12 PRIMARY OSTEOARTHRITIS OF LEFT KNEE: ICD-10-CM

## 2024-12-16 DIAGNOSIS — R21 RASH: ICD-10-CM

## 2024-12-16 DIAGNOSIS — M60.9 MYOFASCITIS: ICD-10-CM

## 2024-12-16 DIAGNOSIS — N31.9 NEUROGENIC BLADDER: ICD-10-CM

## 2024-12-16 DIAGNOSIS — I10 BENIGN ESSENTIAL HYPERTENSION: ICD-10-CM

## 2024-12-16 DIAGNOSIS — Z00.00 ROUTINE GENERAL MEDICAL EXAMINATION AT HEALTH CARE FACILITY: ICD-10-CM

## 2024-12-16 DIAGNOSIS — E78.00 ELEVATED LDL CHOLESTEROL LEVEL: ICD-10-CM

## 2024-12-16 LAB
ALBUMIN SERPL BCP-MCNC: 4.3 G/DL (ref 3.4–5)
ALP SERPL-CCNC: 64 U/L (ref 33–136)
ALT SERPL W P-5'-P-CCNC: 18 U/L (ref 10–52)
ANION GAP SERPL CALC-SCNC: 12 MMOL/L (ref 10–20)
AST SERPL W P-5'-P-CCNC: 18 U/L (ref 9–39)
BASOPHILS # BLD AUTO: 0.02 X10*3/UL (ref 0–0.1)
BASOPHILS NFR BLD AUTO: 0.5 %
BILIRUB SERPL-MCNC: 0.8 MG/DL (ref 0–1.2)
BUN SERPL-MCNC: 12 MG/DL (ref 6–23)
CALCIUM SERPL-MCNC: 10 MG/DL (ref 8.6–10.6)
CHLORIDE SERPL-SCNC: 104 MMOL/L (ref 98–107)
CHOLEST SERPL-MCNC: 177 MG/DL (ref 0–199)
CHOLESTEROL/HDL RATIO: 2.5
CO2 SERPL-SCNC: 28 MMOL/L (ref 21–32)
CREAT SERPL-MCNC: 0.79 MG/DL (ref 0.5–1.3)
EGFRCR SERPLBLD CKD-EPI 2021: >90 ML/MIN/1.73M*2
EOSINOPHIL # BLD AUTO: 0.12 X10*3/UL (ref 0–0.4)
EOSINOPHIL NFR BLD AUTO: 3.1 %
ERYTHROCYTE [DISTWIDTH] IN BLOOD BY AUTOMATED COUNT: 15 % (ref 11.5–14.5)
EST. AVERAGE GLUCOSE BLD GHB EST-MCNC: 117 MG/DL
GLUCOSE SERPL-MCNC: 94 MG/DL (ref 74–99)
HBA1C MFR BLD: 5.7 %
HCT VFR BLD AUTO: 42.3 % (ref 41–52)
HDLC SERPL-MCNC: 70.5 MG/DL
HGB BLD-MCNC: 13.6 G/DL (ref 13.5–17.5)
IMM GRANULOCYTES # BLD AUTO: 0.01 X10*3/UL (ref 0–0.5)
IMM GRANULOCYTES NFR BLD AUTO: 0.3 % (ref 0–0.9)
LDLC SERPL CALC-MCNC: 94 MG/DL
LYMPHOCYTES # BLD AUTO: 1.32 X10*3/UL (ref 0.8–3)
LYMPHOCYTES NFR BLD AUTO: 34.5 %
MCH RBC QN AUTO: 29.2 PG (ref 26–34)
MCHC RBC AUTO-ENTMCNC: 32.2 G/DL (ref 32–36)
MCV RBC AUTO: 91 FL (ref 80–100)
MONOCYTES # BLD AUTO: 0.38 X10*3/UL (ref 0.05–0.8)
MONOCYTES NFR BLD AUTO: 9.9 %
NEUTROPHILS # BLD AUTO: 1.98 X10*3/UL (ref 1.6–5.5)
NEUTROPHILS NFR BLD AUTO: 51.7 %
NON HDL CHOLESTEROL: 107 MG/DL (ref 0–149)
NRBC BLD-RTO: 0 /100 WBCS (ref 0–0)
PLATELET # BLD AUTO: 265 X10*3/UL (ref 150–450)
POTASSIUM SERPL-SCNC: 4.3 MMOL/L (ref 3.5–5.3)
PROT SERPL-MCNC: 7.3 G/DL (ref 6.4–8.2)
RBC # BLD AUTO: 4.66 X10*6/UL (ref 4.5–5.9)
SODIUM SERPL-SCNC: 140 MMOL/L (ref 136–145)
TRIGL SERPL-MCNC: 64 MG/DL (ref 0–149)
TSH SERPL-ACNC: 1.06 MIU/L (ref 0.44–3.98)
VLDL: 13 MG/DL (ref 0–40)
WBC # BLD AUTO: 3.8 X10*3/UL (ref 4.4–11.3)

## 2024-12-16 PROCEDURE — 84443 ASSAY THYROID STIM HORMONE: CPT

## 2024-12-16 PROCEDURE — 3074F SYST BP LT 130 MM HG: CPT | Performed by: INTERNAL MEDICINE

## 2024-12-16 PROCEDURE — 1170F FXNL STATUS ASSESSED: CPT | Performed by: INTERNAL MEDICINE

## 2024-12-16 PROCEDURE — 99214 OFFICE O/P EST MOD 30 MIN: CPT | Performed by: INTERNAL MEDICINE

## 2024-12-16 PROCEDURE — 1159F MED LIST DOCD IN RCRD: CPT | Performed by: INTERNAL MEDICINE

## 2024-12-16 PROCEDURE — 80053 COMPREHEN METABOLIC PANEL: CPT

## 2024-12-16 PROCEDURE — G0439 PPPS, SUBSEQ VISIT: HCPCS | Performed by: INTERNAL MEDICINE

## 2024-12-16 PROCEDURE — 85025 COMPLETE CBC W/AUTO DIFF WBC: CPT

## 2024-12-16 PROCEDURE — 1160F RVW MEDS BY RX/DR IN RCRD: CPT | Performed by: INTERNAL MEDICINE

## 2024-12-16 PROCEDURE — 1036F TOBACCO NON-USER: CPT | Performed by: INTERNAL MEDICINE

## 2024-12-16 PROCEDURE — 36415 COLL VENOUS BLD VENIPUNCTURE: CPT

## 2024-12-16 PROCEDURE — 3079F DIAST BP 80-89 MM HG: CPT | Performed by: INTERNAL MEDICINE

## 2024-12-16 PROCEDURE — 80061 LIPID PANEL: CPT

## 2024-12-16 PROCEDURE — 83036 HEMOGLOBIN GLYCOSYLATED A1C: CPT

## 2024-12-16 RX ORDER — VALACYCLOVIR HYDROCHLORIDE 500 MG/1
500 TABLET, FILM COATED ORAL 2 TIMES DAILY
Qty: 60 TABLET | Refills: 2 | Status: CANCELLED | OUTPATIENT
Start: 2024-12-16

## 2024-12-16 RX ORDER — TRIAMCINOLONE ACETONIDE 1 MG/G
CREAM TOPICAL 2 TIMES DAILY
Qty: 28.4 G | Refills: 1 | Status: SHIPPED | OUTPATIENT
Start: 2024-12-16

## 2024-12-16 RX ORDER — CYCLOBENZAPRINE HCL 10 MG
10 TABLET ORAL NIGHTLY PRN
Qty: 30 TABLET | Refills: 2 | Status: SHIPPED | OUTPATIENT
Start: 2024-12-16 | End: 2025-08-13

## 2024-12-16 ASSESSMENT — ENCOUNTER SYMPTOMS
ENDOCRINE NEGATIVE: 1
CARDIOVASCULAR NEGATIVE: 1
HEMATOLOGIC/LYMPHATIC NEGATIVE: 1
EYES NEGATIVE: 1
CONSTITUTIONAL NEGATIVE: 1
MUSCULOSKELETAL NEGATIVE: 1
RESPIRATORY NEGATIVE: 1
GASTROINTESTINAL NEGATIVE: 1
NEUROLOGICAL NEGATIVE: 1
PSYCHIATRIC NEGATIVE: 1
ALLERGIC/IMMUNOLOGIC NEGATIVE: 1
DEPRESSION: 0
OCCASIONAL FEELINGS OF UNSTEADINESS: 0
LOSS OF SENSATION IN FEET: 0

## 2024-12-16 ASSESSMENT — PATIENT HEALTH QUESTIONNAIRE - PHQ9
SUM OF ALL RESPONSES TO PHQ9 QUESTIONS 1 AND 2: 0
1. LITTLE INTEREST OR PLEASURE IN DOING THINGS: NOT AT ALL
2. FEELING DOWN, DEPRESSED OR HOPELESS: NOT AT ALL

## 2024-12-16 ASSESSMENT — ACTIVITIES OF DAILY LIVING (ADL)
BATHING: INDEPENDENT
DRESSING: INDEPENDENT
MANAGING_FINANCES: INDEPENDENT
GROCERY_SHOPPING: INDEPENDENT
TAKING_MEDICATION: INDEPENDENT
DOING_HOUSEWORK: INDEPENDENT
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
## 2024-12-16 NOTE — ASSESSMENT & PLAN NOTE
Hypercholesterolemia - Monitor lipid profile and educate patient upon risks of high cholesterol and targets. Educate diet and change in lifestyle and increase in exercises - Refill: Atorvastatin  and educate compliance with medication and diet.           Orders:    Comprehensive Metabolic Panel; Future    Lipid Panel; Future

## 2024-12-16 NOTE — ASSESSMENT & PLAN NOTE
Thyroid goiter and follows with Dr. Abreu surgery and stable    And follow the thyroid function     Orders:    TSH with reflex to Free T4 if abnormal; Future
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
## 2025-01-02 DIAGNOSIS — T78.40XD ALLERGY, SUBSEQUENT ENCOUNTER: Primary | ICD-10-CM

## 2025-01-03 RX ORDER — FLUTICASONE PROPIONATE 50 MCG
SPRAY, SUSPENSION (ML) NASAL
Qty: 16 ML | Refills: 5 | Status: SHIPPED | OUTPATIENT
Start: 2025-01-03

## 2025-03-03 ENCOUNTER — HOSPITAL ENCOUNTER (OUTPATIENT)
Dept: RADIOLOGY | Facility: CLINIC | Age: 75
Discharge: HOME | End: 2025-03-03
Payer: MEDICARE

## 2025-03-03 DIAGNOSIS — E04.2 MULTINODULAR THYROID: ICD-10-CM

## 2025-03-03 PROCEDURE — 76536 US EXAM OF HEAD AND NECK: CPT | Performed by: RADIOLOGY

## 2025-03-03 PROCEDURE — 76536 US EXAM OF HEAD AND NECK: CPT

## 2025-03-10 ENCOUNTER — APPOINTMENT (OUTPATIENT)
Dept: PRIMARY CARE | Facility: CLINIC | Age: 75
End: 2025-03-10
Payer: MEDICARE

## 2025-03-10 VITALS
OXYGEN SATURATION: 99 % | HEIGHT: 69 IN | BODY MASS INDEX: 28.73 KG/M2 | HEART RATE: 59 BPM | SYSTOLIC BLOOD PRESSURE: 120 MMHG | DIASTOLIC BLOOD PRESSURE: 70 MMHG | RESPIRATION RATE: 21 BRPM | WEIGHT: 194 LBS

## 2025-03-10 DIAGNOSIS — M54.16 LUMBAR BACK PAIN WITH RADICULOPATHY AFFECTING RIGHT LOWER EXTREMITY: ICD-10-CM

## 2025-03-10 DIAGNOSIS — I10 BENIGN ESSENTIAL HYPERTENSION: Primary | ICD-10-CM

## 2025-03-10 DIAGNOSIS — R53.83 OTHER FATIGUE: ICD-10-CM

## 2025-03-10 DIAGNOSIS — M51.362 DEGENERATION OF INTERVERTEBRAL DISC OF LUMBAR REGION WITH DISCOGENIC BACK PAIN AND LOWER EXTREMITY PAIN: ICD-10-CM

## 2025-03-10 DIAGNOSIS — R73.9 HYPERGLYCEMIA: ICD-10-CM

## 2025-03-10 DIAGNOSIS — E78.00 ELEVATED LDL CHOLESTEROL LEVEL: ICD-10-CM

## 2025-03-10 DIAGNOSIS — C61 PROSTATIC ADENOCARCINOMA (MULTI): ICD-10-CM

## 2025-03-10 DIAGNOSIS — E04.2 MULTIPLE THYROID NODULES: ICD-10-CM

## 2025-03-10 DIAGNOSIS — N31.9 NEUROGENIC BLADDER: ICD-10-CM

## 2025-03-10 PROCEDURE — 3074F SYST BP LT 130 MM HG: CPT | Performed by: INTERNAL MEDICINE

## 2025-03-10 PROCEDURE — G2211 COMPLEX E/M VISIT ADD ON: HCPCS | Performed by: INTERNAL MEDICINE

## 2025-03-10 PROCEDURE — 3078F DIAST BP <80 MM HG: CPT | Performed by: INTERNAL MEDICINE

## 2025-03-10 PROCEDURE — 1159F MED LIST DOCD IN RCRD: CPT | Performed by: INTERNAL MEDICINE

## 2025-03-10 PROCEDURE — 99214 OFFICE O/P EST MOD 30 MIN: CPT | Performed by: INTERNAL MEDICINE

## 2025-03-10 PROCEDURE — 1036F TOBACCO NON-USER: CPT | Performed by: INTERNAL MEDICINE

## 2025-03-10 ASSESSMENT — ENCOUNTER SYMPTOMS
PSYCHIATRIC NEGATIVE: 1
CONSTITUTIONAL NEGATIVE: 1
RESPIRATORY NEGATIVE: 1
GASTROINTESTINAL NEGATIVE: 1
CARDIOVASCULAR NEGATIVE: 1
NEUROLOGICAL NEGATIVE: 1
MUSCULOSKELETAL NEGATIVE: 1
ALLERGIC/IMMUNOLOGIC NEGATIVE: 1
ENDOCRINE NEGATIVE: 1
EYES NEGATIVE: 1
HEMATOLOGIC/LYMPHATIC NEGATIVE: 1

## 2025-03-10 NOTE — ASSESSMENT & PLAN NOTE
HTN - hypertension well/controlled .Target BP < 130/80 achieved. Educate low salt diet and exercise with weight loss. Educate home self monitoring and diary keeping. Educate risks of elevate blood pressure and benefits of prompt treatment.    HTN - hypertension well/controlled .Target BP < 130/80 achieved. Educate low salt diet and exercise with weight loss. Educate home self monitoring and diary keeping. Educate risks of elevate blood pressure and benefits of prompt treatment.

## 2025-03-10 NOTE — PROGRESS NOTES
"Subjective   Patient ID: Gee Hardin is a 75 y.o. male who presents for Follow-up (3 month follow up).    HPI     Review of Systems   Constitutional: Negative.    HENT: Negative.     Eyes: Negative.    Respiratory: Negative.     Cardiovascular: Negative.    Gastrointestinal: Negative.    Endocrine: Negative.    Musculoskeletal: Negative.    Skin: Negative.    Allergic/Immunologic: Negative.    Neurological: Negative.    Hematological: Negative.    Psychiatric/Behavioral: Negative.     All other systems reviewed and are negative.      Objective   /70   Pulse 59   Resp 21   Ht 1.753 m (5' 9\")   Wt 88 kg (194 lb 0.1 oz)   SpO2 99%   BMI 28.65 kg/m²     Physical Exam  Vitals and nursing note reviewed.   Constitutional:       Appearance: Normal appearance.   HENT:      Head: Normocephalic and atraumatic.      Right Ear: Tympanic membrane, ear canal and external ear normal.      Left Ear: Tympanic membrane, ear canal and external ear normal. There is no impacted cerumen.      Nose: Nose normal.      Mouth/Throat:      Mouth: Mucous membranes are moist.      Pharynx: Oropharynx is clear.   Eyes:      Extraocular Movements: Extraocular movements intact.      Conjunctiva/sclera: Conjunctivae normal.      Pupils: Pupils are equal, round, and reactive to light.   Cardiovascular:      Rate and Rhythm: Normal rate and regular rhythm.      Pulses: Normal pulses.      Heart sounds: Normal heart sounds. No murmur heard.  Pulmonary:      Effort: Pulmonary effort is normal. No respiratory distress.      Breath sounds: Normal breath sounds. No stridor. No wheezing, rhonchi or rales.   Chest:      Chest wall: No tenderness.   Abdominal:      General: Abdomen is flat. Bowel sounds are normal. There is no distension.      Palpations: Abdomen is soft. There is no mass.      Tenderness: There is no abdominal tenderness. There is no right CVA tenderness, left CVA tenderness, guarding or rebound.      Hernia: No hernia is " present.   Musculoskeletal:         General: Normal range of motion.      Cervical back: Normal range of motion and neck supple.   Skin:     General: Skin is warm.      Capillary Refill: Capillary refill takes less than 2 seconds.   Neurological:      General: No focal deficit present.      Mental Status: He is alert.      Cranial Nerves: No cranial nerve deficit.      Sensory: No sensory deficit.      Motor: No weakness.      Coordination: Coordination normal.      Gait: Gait normal.      Deep Tendon Reflexes: Reflexes normal.   Psychiatric:         Mood and Affect: Mood normal.         Behavior: Behavior normal. Behavior is cooperative.         Thought Content: Thought content normal.         Judgment: Judgment normal.         Assessment/Plan   Problem List Items Addressed This Visit             ICD-10-CM    Benign essential hypertension - Primary I10     HTN - hypertension well/controlled .Target BP < 130/80 achieved. Educate low salt diet and exercise with weight loss. Educate home self monitoring and diary keeping. Educate risks of elevate blood pressure and benefits of prompt treatment.    HTN - hypertension well/controlled .Target BP < 130/80 achieved. Educate low salt diet and exercise with weight loss. Educate home self monitoring and diary keeping. Educate risks of elevate blood pressure and benefits of prompt treatment.             Degenerative disc disease, lumbar M51.369     DDD - Degenerative disc disease of the Lumbo-Sacral (LS)/Cervical (C) spine. Educate exercises and referred patient to Physical Therapy (PT). Ordered X-Ray's of the LS/C spine. Consider MRI. Radiculopathy in the distribution of L4-L5-S1/C3-C4-C5 nerve roots, needs NSAIDS (Naprosin 500mg BID vs. Arthrotec 75mg BID or Prednisone taper (Medrol dose pack), Flexeril 10 mg qHS, heat application, and pain control with Tylenol          Elevated LDL cholesterol level E78.00     Hypercholesterolemia - Monitor lipid profile and educate patient  upon risks of high cholesterol and targets. Educate diet and change in lifestyle and increase in exercises - Refill: Atorvastatin  and educate compliance with medication and diet.            Orders:    Comprehensive Metabolic Panel; Future    Lipid Panel; Future            Lumbar back pain with radiculopathy affecting right lower extremity M54.16     DDD - Degenerative disc disease of the Lumbo-Sacral (LS)/ spine. Educate exercises and referred patient to Physical Therapy (PT). Ordered X-Ray's of the LS spine. Consider MRI. Radiculopathy in the distribution of L4-L5-S1/ nerve roots, needs NSAIDS (Naprosin 500mg BID vs. Arthrotec 75mg BID or Prednisone taper (Medrol dose pack), Flexeril 10 mg qHS, heat application, and pain control with Tylenol          Neurogenic bladder N31.9     Neurogenic Bladder. Rec. U/A and cultures and start empiric antibiotic therapy : Cipro 500 BID for 5 days and recheck urine analysis (U/A) at the end of the therapy to check effectiveness of the therapy. Treat neurogenic bladder: Hyperactive bladder - with Detrol 4 mg daily, or Vesicare 5 mg / 10 mg daily, Oxybutinin 5mg/ 10mg daily, educate bladder training exercises(Keggel exercises) and change in lifestyle(reduce fluid intake for 4 hours before bed time).             Multiple thyroid nodules E04.2     goiter and thyroid nodule and check US and function  and follows with surgery          Prostatic adenocarcinoma (Multi) C61     Monitor PSA and Asymptomatic   - and feels well and on Casodex                        Benign essential hypertension  HTN - hypertension well/controlled .Target BP < 130/80 achieved. Educate low salt diet and exercise with weight loss. Educate home self monitoring and diary keeping. Educate risks of elevate blood pressure and benefits of prompt treatment.         Elevated LDL cholesterol level  Hypercholesterolemia - Monitor lipid profile and educate patient upon risks of high cholesterol and targets. Educate diet  and change in lifestyle and increase in exercises - Refill: Atorvastatin  and educate compliance with medication and diet.            Orders:    Comprehensive Metabolic Panel; Future    Lipid Panel; Future     Thyroid goiter  Thyroid goiter and follows with Dr. Brady vaughan and stable    And follow the thyroid function      Orders:    TSH with reflex to Free T4 if abnormal; Future     Neurogenic bladder  Neurogenic Bladder. Rec. U/A and cultures and start empiric antibiotic therapy : Cipro 500 BID for 5 days and recheck urine analysis (U/A) at the end of the therapy to check effectiveness of the therapy. Treat neurogenic bladder: Hyperactive bladder - with Detrol 4 mg daily, or Vesicare 5 mg / 10 mg daily, Oxybutinin 5mg/ 10mg daily, educate bladder training exercises(Keggel exercises) and change in lifestyle(reduce fluid intake for 4 hours before bed time).         Prostatic adenocarcinoma (Multi)  Monitor PSA and Asymptomatic   - and feels well and on Casodex                Primary osteoarthritis of left knee  DJD - Degenerative Joint Disease, Chronic Arthritis, of the Left more than the right  Knee. . Pain control, and anti-inflammatory meds : consider Kenalog injection and start Voltaren gel 1% topically BID,  and  Tylenol and Tramadol/Vicodan 5/325 mg TID PRN. Educate exercises and referred the patient to PT (Physical Therapy). Get X-Ray's.            Lumbar back pain with radiculopathy affecting right lower extremity  DDD - Degenerative disc disease of the Lumbo-Sacral (LS)/ spine. Educate exercises and referred patient to Physical Therapy (PT). Ordered X-Ray's of the LS spine. Consider MRI. Radiculopathy in the distribution of L4-L5-S1/ nerve roots, needs NSAIDS (Naprosin 500mg BID vs. Arthrotec 75mg BID or Prednisone taper (Medrol dose pack), Flexeril 10 mg qHS, heat application, and pain control with Tylenol         Hyperglycemia     Orders:    Hemoglobin A1C; Future     Megaloblastic anemia      Orders:    CBC and Auto Differential; Future              Cardiac Risk Assessment  15 - 20 minutes were spent discussing Cardiovascular risk and, if needed, lifestyle modifications were recommended, including nutritional choices, exercise, and elimination of habits contributing to risk.   Aspirin use/disuse was discussed following the guidelines below:  low dose ASA ( mg) should be considered:     If prior Heart Attack/Stroke/Peripheral vascular disease:  Generally recommend daily low dose aspirin unless extremely high bleeding risk (e.g., gastrointestinal).     If no prior Heart Attack/Stroke/Peripheral vascular disease:                                   Age over 70: Do not use Aspirin for prevention                          Age less than 70 and 10-year cardiovascular disease risk is >20%: use low dose Aspirin for prevention.                                          Benign essential hypertension I10         HTN - hypertension well/controlled .Target BP < 130/80 achieved. Educate low salt diet and exercise with weight loss. Educate home self monitoring and diary keeping. Educate risks of elevate blood pressure and benefits of prompt treatment. Refill            Elevated LDL cholesterol level E78.00       Hypercholesterolemia - Monitor lipid profile and educate patient upon risks of high cholesterol and targets. Educate diet and change in lifestyle and increase in exercises - Refill: Atorvastatin  and educate compliance with medication and diet.                  Relevant Medications     atorvastatin (Lipitor) 10 mg tablet     Gout, arthropathy M10.9     Relevant Medications     allopurinol (Zyloprim) 100 mg tablet     Thyroid goiter E04.9       Thyroid goiter and follows with Dr. Abreu surgery and stable            Lumbar back pain with radiculopathy affecting right lower extremity - Primary M54.16       DDD - Degenerative disc disease of the Lumbo-Sacral (LS)/  spine. Educate exercises and referred patient  to Physical Therapy (PT). Ordered X-Ray's of the LS spine. Consider MRI. Radiculopathy in the distribution of L4-L5-S1/ nerve roots, needs NSAIDS (Naprosin 500mg BID vs. Arthrotec 75mg BID or Prednisone taper (Medrol dose pack), Flexeril 10 mg qHS, heat application, and pain control with Tylenol            Prostatic adenocarcinoma (Multi) C61       Monitor PSA and Asymptomatic   - and feels well and on Casodex                             Benign essential hypertension - Primary I10         HTN - hypertension well/controlled .Target BP < 130/80 achieved. Educate low salt diet and exercise with weight loss. Educate home self monitoring and diary keeping. Educate risks of elevate blood pressure and benefits of prompt treatment. Refill            Degenerative disc disease, lumbar M51.36       DDD - Degenerative disc disease of the Lumbo-Sacral (LS)/Cervical (C) spine. Educate exercises and referred patient to Physical Therapy (PT). Ordered X-Ray's of the LS/C spine. Consider MRI. Radiculopathy in the distribution of L4-L5-S1/C3-C4-C5 nerve roots, needs NSAIDS (Naprosin 500mg BID vs. Arthrotec 75mg BID or Prednisone taper (Medrol dose pack), Flexeril 10 mg qHS, heat application, and pain control with Tylenol            Elevated LDL cholesterol level E78.00       Hypercholesterolemia - Monitor lipid profile and educate patient upon risks of high cholesterol and targets. Educate diet and change in lifestyle and increase in exercises - Refill: Atorvastatin  and educate compliance with medication and diet.                  Thyroid goiter E04.9       Thyroid goiter and follows with Dr. Abreu surgery and stable            Neurogenic bladder N31.9       Neurogenic Bladder. Rec. U/A and cultures and start empiric antibiotic therapy : Cipro 500 BID for 5 days and recheck urine analysis (U/A) at the end of the therapy to check effectiveness of the therapy. Treat neurogenic bladder: Hyperactive bladder - with Detrol 4 mg daily, or  Vesicare 5 mg / 10 mg daily, Oxybutinin 5mg/ 10mg daily,  educate bladder training exercises(Keggel exercises) and change in lifestyle(reduce fluid intake for 4 hours before bed time).            Prostatic adenocarcinoma (Multi) C61       Monitor PSA and Asymptomatic   - and feels well and on Casodex                                                           Benign essential hypertension I10             HTN - hypertension well/controlled .Target BP < 130/80 achieved. Educate low salt diet and exercise with weight loss. Educate home self monitoring and diary keeping. Educate risks of elevate blood pressure and benefits of prompt treatment. Refill              Elevated LDL cholesterol level E78.00           Daniel Ortiz MD1/22/2024 3:28 PM     Hypercholesterolemia - Monitor lipid profile and educate patient upon risks of high cholesterol and targets. Educate diet and change in lifestyle and increase in exercises - Refill: Atorvastatin  and educate compliance with medication and diet.                  Lumbar disc disease with radiculopathy M51.16           DDD - Degenerative disc disease of the Lumbo-Sacral (LS)/spine. Educate exercises and referred patient to Physical Therapy (PT). Ordered X-Ray's of the LS/ spine. Consider MRI. Radiculopathy in the distribution of L4-L5-S1/ nerve roots, needs NSAIDS (Naprosin 500mg BID vs. Arthrotec 75mg BID or Prednisone taper (Medrol dose pack), Flexeril 10 mg qHS, heat application, and pain control with Tylenol               Thyroid goiter E04.9           Thyroid goiter and follows with Dr. Abreu surgery and stable              Other chest pain - Primary R07.89           CP - Chest Pain, atypical/angina, EKG with no significant ischemic changes. Referred patient to stress test. Risk factors includes: HTN, Hypercholesterolemia, age, family history,                                                                              Control risk factors and educate diet and life  style changes.                                                                                                                     Relevant Orders         Nuclear Stress Test          Bradycardia = Asymptomatic  and EKG shows no ischemia  - we will follow and get a stress test in light of his chest pain               Benign essential hypertension I10           HTN - hypertension well/controlled .Target BP < 130/80  achieved. Educate low salt diet and exercise with weight loss. Educate home self monitoring and diary keeping. Educate risks of elevate blood pressure and benefits of prompt treatment.  Refill            Relevant Orders       Comprehensive Metabolic Panel       Elevated LDL cholesterol level E78.00         Hypercholesterolemia - Monitor lipid profile and educate patient upon risks of high cholesterol and targets. Educate diet and change in lifestyle and increase in exercises - Refill: Atorvastatin  and educate compliance with medication and diet.              Relevant Orders       Lipid Panel       Lumbar disc disease with radiculopathy M51.16         DDD - Degenerative disc disease of the Lumbo-Sacral (LS)/spine. Educate exercises and referred patient to Physical Therapy (PT). Ordered X-Ray's of the LS/ spine. Consider MRI. Radiculopathy in the distribution of L4-L5-S1/ nerve roots, needs NSAIDS (Naprosin 500mg BID vs. Arthrotec 75mg BID or Prednisone taper (Medrol dose pack), Flexeril 10 mg qHS, heat application, and pain control with Tylenol             Neurogenic bladder N31.9       Multiple thyroid nodules E04.2         .goiter and thyroid nodule and check US and function  and follows with surgery            Relevant Orders       TSH with reflex to Free T4 if abnormal                               Immunizations/Injections       very important  Immunizations from outside sources need reconciliation.       Influenza, High Dose Seasonal, Preservative Free10/10/2022, 10/8/2021, 10/5/2020,  ... (5  more)  Influenza, Seasonal, Quadrivalent, Adjuvanted8/31/2023  Influenza, Yjpdxnalepx76/10/2022, 10/8/2021, 11/19/2010  Influenza, seasonal, zwkxcvxzcj80/4/2013, 10/30/2012  Moderna COVID-19 vaccine, Fall 2023, 12 yeasrs and older (50mcg/0.5mL)9/30/2023  Moderna COVID-19 vaccine, bivalent, blue cap/gray label *Check age/dose*9/18/2022  Moderna SARS-CoV-2 Vaccination2/12/2022, 8/15/2021, 3/4/2021,  ... (1 more)  Pneumococcal conjugate vaccine, 13-valent (PREVNAR 13)9/17/2015  Pneumococcal conjugate vaccine, 20-valent (PREVNAR 20)8/31/2023  Pneumococcal polysaccharide vaccine, 23-valent, age 2 years and older (PNEUMOVAX 23)5/9/2021, 12/22/2014  Td vaccine, age 7 years and older (TDVAX)8/21/2009  Zoster vaccine, recombinant, adult (SHINGRIX)5/9/2021, 3/5/2019  Zoster, live9/14/2011                     Immunizations/Injections       very important  Immunizations from outside sources need reconciliation.       Influenza, High Dose Seasonal, Preservative Free10/10/2022, 10/8/2021, 10/5/2020,  ... (5 more)  Influenza, Seasonal, Quadrivalent, Adjuvanted8/31/2023  Influenza, Wtvbpybcybk79/10/2022, 10/8/2021, 11/19/2010  Influenza, seasonal, ummxytweym31/4/2013, 10/30/2012  Moderna COVID-19 vaccine, Fall 2023, 12 yeasrs and older (50mcg/0.5mL)9/30/2023  Moderna COVID-19 vaccine, bivalent, blue cap/gray label *Check age/dose*9/18/2022  Moderna SARS-CoV-2 Vaccination2/12/2022, 8/15/2021, 3/4/2021,  ... (1 more)  Pneumococcal conjugate vaccine, 13-valent (PREVNAR 13)9/17/2015  Pneumococcal conjugate vaccine, 20-valent (PREVNAR 20)8/31/2023  Pneumococcal polysaccharide vaccine, 23-valent, age 2 years and older (PNEUMOVAX 23)5/9/2021, 12/22/2014  Td vaccine, age 7 years and older (TDVAX)8/21/2009  Zoster vaccine, recombinant, adult (SHINGRIX)5/9/2021, 3/5/2019  Zoster, live9/14/2011                  Immunizations/Injections       very important  Immunizations from outside sources need reconciliation.       Influenza, High Dose  Seasonal, Preservative Free10/10/2022, 10/8/2021, 10/5/2020,  ... (5 more)  Influenza, Seasonal, Quadrivalent, Adjuvanted8/31/2023  Influenza, Tywhsfljsvq59/10/2022, 10/8/2021, 11/19/2010  Influenza, seasonal, eemxjmvkqk72/4/2013, 10/30/2012  Moderna COVID-19 vaccine, Fall 2023, 12 yeasrs and older (50mcg/0.5mL)9/30/2023  Moderna COVID-19 vaccine, bivalent, blue cap/gray label *Check age/dose*9/18/2022  Moderna SARS-CoV-2 Vaccination2/12/2022, 8/15/2021, 3/4/2021,  ... (1 more)  Pneumococcal conjugate vaccine, 13-valent (PREVNAR 13)9/17/2015  Pneumococcal conjugate vaccine, 20-valent (PREVNAR 20)8/31/2023  Pneumococcal polysaccharide vaccine, 23-valent, age 2 years and older (PNEUMOVAX 23)5/9/2021, 12/22/2014  Td vaccine, age 7 years and older (TDVAX)8/21/2009  Zoster vaccine, recombinant, adult (SHINGRIX)5/9/2021, 3/5/2019  Zoster, live9/14/2011                  Immunizations/Injections       very important  Immunizations from outside sources need reconciliation.       Flu vaccine, trivalent, preservative free, HIGH-DOSE, age 65y+ (Fluzone)10/10/2022, 10/8/2021, 10/5/2020,  ... (5 more)  Influenza, Seasonal, Quadrivalent, Adjuvanted8/31/2023  Influenza, Mzpvvnohrhp58/10/2022, 10/8/2021, 11/19/2010  Influenza, seasonal, ppfbaeswgv08/4/2013, 10/30/2012  Moderna COVID-19 vaccine, 12 years and older (50mcg/0.5mL)(Spikevax)9/30/2023  Moderna COVID-19 vaccine, bivalent, blue cap/gray label *Check age/dose*9/18/2022  Moderna SARS-CoV-2 Vaccination2/12/2022, 8/15/2021, 3/4/2021,  ... (1 more)  Pneumococcal conjugate vaccine, 13-valent (PREVNAR 13)9/17/2015  Pneumococcal conjugate vaccine, 20-valent (PREVNAR 20)8/31/2023  Pneumococcal polysaccharide vaccine, 23-valent, age 2 years and older (PNEUMOVAX 23)5/9/2021, 12/22/2014  Td vaccine, age 7 years and older (TDVAX)8/21/2009  Zoster vaccine, recombinant, adult (SHINGRIX)5/9/2021, 3/5/2019  Zoster, live9/14/2011               Immunizations/Injections       very important   Immunizations from outside sources need reconciliation.       Flu vaccine, trivalent, preservative free, HIGH-DOSE, age 65y+ (Fluzone)10/10/2022, 10/8/2021, 10/5/2020,  ... (5 more)  Influenza, Seasonal, Quadrivalent, Adjuvanted8/31/2023  Influenza, Qqzwlvhrjlb12/10/2022, 10/8/2021, 11/19/2010  Influenza, seasonal, ujfdckpsbw52/4/2013, 10/30/2012  Moderna COVID-19 vaccine, 12 years and older (50mcg/0.5mL)(Spikevax)9/30/2023  Moderna COVID-19 vaccine, bivalent, blue cap/gray label *Check age/dose*9/18/2022  Moderna SARS-CoV-2 Vaccination2/12/2022, 8/15/2021, 3/4/2021,  ... (1 more)  Pneumococcal conjugate vaccine, 13-valent (PREVNAR 13)9/17/2015  Pneumococcal conjugate vaccine, 20-valent (PREVNAR 20)8/31/2023  Pneumococcal polysaccharide vaccine, 23-valent, age 2 years and older (PNEUMOVAX 23)5/9/2021, 12/22/2014  Td vaccine, age 7 years and older (TDVAX)8/21/2009  Zoster vaccine, recombinant, adult (SHINGRIX)5/9/2021, 3/5/2019  Zoster, live9/14/2011          Immunizations/Injections      very important  Immunizations from outside sources need reconciliation.      Flu vaccine, trivalent, preservative free, HIGH-DOSE, age 65y+ (Fluzone)10/10/2022, 10/8/2021, 10/5/2020,  ... (5 more)  Influenza, Seasonal, Quadrivalent, Adjuvanted8/31/2023  Influenza, Hxcnubvjsnx28/10/2022, 10/8/2021, 11/19/2010  Influenza, seasonal, iysgyttmtv61/4/2013, 10/30/2012  Moderna COVID-19 vaccine, 12 years and older (50mcg/0.5mL)(Spikevax)9/30/2023  Moderna COVID-19 vaccine, bivalent, blue cap/gray label *Check age/dose*9/18/2022  Moderna SARS-CoV-2 Vaccination2/12/2022, 8/15/2021, 3/4/2021,  ... (1 more)  Pneumococcal conjugate vaccine, 13-valent (PREVNAR 13)9/17/2015  Pneumococcal conjugate vaccine, 20-valent (PREVNAR 20)8/31/2023  Pneumococcal polysaccharide vaccine, 23-valent, age 2 years and older (PNEUMOVAX 23)5/9/2021, 12/22/2014  Td vaccine, age 7 years and older (TDVAX)8/21/2009  Zoster vaccine, recombinant, adult (SHINGRIX)5/9/2021,  3/5/2019  Zoster, live9/14/2011

## 2025-03-10 NOTE — ASSESSMENT & PLAN NOTE
Hypercholesterolemia - Monitor lipid profile and educate patient upon risks of high cholesterol and targets. Educate diet and change in lifestyle and increase in exercises - Refill: Atorvastatin  and educate compliance with medication and diet.            Orders:    Comprehensive Metabolic Panel; Future    Lipid Panel; Future      Sent pt a my krysten message asking if she takes potassium and how much.

## 2025-03-11 LAB
ALBUMIN SERPL-MCNC: 4.4 G/DL (ref 3.6–5.1)
ALP SERPL-CCNC: 53 U/L (ref 35–144)
ALT SERPL-CCNC: 17 U/L (ref 9–46)
ANION GAP SERPL CALCULATED.4IONS-SCNC: 11 MMOL/L (CALC) (ref 7–17)
AST SERPL-CCNC: 19 U/L (ref 10–35)
BASOPHILS # BLD AUTO: 20 CELLS/UL (ref 0–200)
BASOPHILS NFR BLD AUTO: 0.5 %
BILIRUB SERPL-MCNC: 0.6 MG/DL (ref 0.2–1.2)
BUN SERPL-MCNC: 22 MG/DL (ref 7–25)
CALCIUM SERPL-MCNC: 9.6 MG/DL (ref 8.6–10.3)
CHLORIDE SERPL-SCNC: 106 MMOL/L (ref 98–110)
CHOLEST SERPL-MCNC: 170 MG/DL
CHOLEST/HDLC SERPL: 2.3 (CALC)
CO2 SERPL-SCNC: 23 MMOL/L (ref 20–32)
CREAT SERPL-MCNC: 0.77 MG/DL (ref 0.7–1.28)
EGFRCR SERPLBLD CKD-EPI 2021: 93 ML/MIN/1.73M2
EOSINOPHIL # BLD AUTO: 101 CELLS/UL (ref 15–500)
EOSINOPHIL NFR BLD AUTO: 2.6 %
ERYTHROCYTE [DISTWIDTH] IN BLOOD BY AUTOMATED COUNT: 13.5 % (ref 11–15)
EST. AVERAGE GLUCOSE BLD GHB EST-MCNC: 128 MG/DL
EST. AVERAGE GLUCOSE BLD GHB EST-SCNC: 7.1 MMOL/L
GLUCOSE SERPL-MCNC: 93 MG/DL (ref 65–99)
HBA1C MFR BLD: 6.1 % OF TOTAL HGB
HCT VFR BLD AUTO: 41.2 % (ref 38.5–50)
HDLC SERPL-MCNC: 75 MG/DL
HGB BLD-MCNC: 13.1 G/DL (ref 13.2–17.1)
LDLC SERPL CALC-MCNC: 81 MG/DL (CALC)
LYMPHOCYTES # BLD AUTO: 1186 CELLS/UL (ref 850–3900)
LYMPHOCYTES NFR BLD AUTO: 30.4 %
MCH RBC QN AUTO: 28.7 PG (ref 27–33)
MCHC RBC AUTO-ENTMCNC: 31.8 G/DL (ref 32–36)
MCV RBC AUTO: 90.2 FL (ref 80–100)
MONOCYTES # BLD AUTO: 300 CELLS/UL (ref 200–950)
MONOCYTES NFR BLD AUTO: 7.7 %
NEUTROPHILS # BLD AUTO: 2293 CELLS/UL (ref 1500–7800)
NEUTROPHILS NFR BLD AUTO: 58.8 %
NONHDLC SERPL-MCNC: 95 MG/DL (CALC)
PLATELET # BLD AUTO: 207 THOUSAND/UL (ref 140–400)
PMV BLD REES-ECKER: 11.4 FL (ref 7.5–12.5)
POTASSIUM SERPL-SCNC: 4.2 MMOL/L (ref 3.5–5.3)
PROT SERPL-MCNC: 6.9 G/DL (ref 6.1–8.1)
PSA SERPL-MCNC: 0.06 NG/ML
RBC # BLD AUTO: 4.57 MILLION/UL (ref 4.2–5.8)
SODIUM SERPL-SCNC: 140 MMOL/L (ref 135–146)
TRIGL SERPL-MCNC: 55 MG/DL
TSH SERPL-ACNC: 0.83 MIU/L (ref 0.4–4.5)
WBC # BLD AUTO: 3.9 THOUSAND/UL (ref 3.8–10.8)

## 2025-04-06 DIAGNOSIS — E78.00 ELEVATED LDL CHOLESTEROL LEVEL: ICD-10-CM

## 2025-04-07 RX ORDER — ATORVASTATIN CALCIUM 10 MG/1
10 TABLET, FILM COATED ORAL DAILY
Qty: 90 TABLET | Refills: 1 | Status: SHIPPED | OUTPATIENT
Start: 2025-04-07

## 2025-04-12 ENCOUNTER — OFFICE VISIT (OUTPATIENT)
Dept: URGENT CARE | Age: 75
End: 2025-04-12
Payer: MEDICARE

## 2025-04-12 ENCOUNTER — HOSPITAL ENCOUNTER (OUTPATIENT)
Dept: RADIOLOGY | Facility: CLINIC | Age: 75
Discharge: HOME | End: 2025-04-12
Payer: MEDICARE

## 2025-04-12 VITALS
SYSTOLIC BLOOD PRESSURE: 101 MMHG | OXYGEN SATURATION: 98 % | DIASTOLIC BLOOD PRESSURE: 45 MMHG | RESPIRATION RATE: 16 BRPM | TEMPERATURE: 97.6 F | HEART RATE: 70 BPM

## 2025-04-12 DIAGNOSIS — M25.562 ACUTE PAIN OF LEFT KNEE: ICD-10-CM

## 2025-04-12 DIAGNOSIS — M25.562 ACUTE PAIN OF LEFT KNEE: Primary | ICD-10-CM

## 2025-04-12 PROCEDURE — 99203 OFFICE O/P NEW LOW 30 MIN: CPT | Performed by: NURSE PRACTITIONER

## 2025-04-12 PROCEDURE — 73562 X-RAY EXAM OF KNEE 3: CPT | Mod: LT

## 2025-04-12 PROCEDURE — 73562 X-RAY EXAM OF KNEE 3: CPT | Mod: LEFT SIDE | Performed by: RADIOLOGY

## 2025-04-12 PROCEDURE — 3074F SYST BP LT 130 MM HG: CPT | Performed by: NURSE PRACTITIONER

## 2025-04-12 PROCEDURE — 3078F DIAST BP <80 MM HG: CPT | Performed by: NURSE PRACTITIONER

## 2025-04-12 PROCEDURE — 1159F MED LIST DOCD IN RCRD: CPT | Performed by: NURSE PRACTITIONER

## 2025-04-13 ASSESSMENT — ENCOUNTER SYMPTOMS
HEMATOLOGIC/LYMPHATIC NEGATIVE: 1
CONSTITUTIONAL NEGATIVE: 1
RESPIRATORY NEGATIVE: 1
JOINT SWELLING: 1
GASTROINTESTINAL NEGATIVE: 1
PSYCHIATRIC NEGATIVE: 1
CARDIOVASCULAR NEGATIVE: 1
ALLERGIC/IMMUNOLOGIC NEGATIVE: 1
ARTHRALGIAS: 1
EYES NEGATIVE: 1

## 2025-04-13 NOTE — PROGRESS NOTES
Subjective   Patient ID: Gee aHrdin is a 75 y.o. male. They present today with a chief complaint of Left knee pain turned last night and heard a pop .    History of Present Illness    History provided by:  Patient   used: No    Injury  Location:  Left knee pain  Severity:  Moderate  Onset quality:  Sudden  Duration:  1 day  Timing:  Intermittent  Progression:  Unchanged  Chronicity:  New  Context:  Left knee pain      Past Medical History  Allergies as of 04/12/2025    (No Known Allergies)       (Not in a hospital admission)       Past Medical History:   Diagnosis Date    Accidental fall 02/12/2024    Acute sinusitis 02/12/2024    Anemia 02/12/2024    Arthralgia of hip 02/12/2024    Arthralgia of temporomandibular joint, unspecified side 09/17/2015    TMJ syndrome    Cough, unspecified 01/15/2015    Cough    Fever, unspecified 01/07/2015    Fever with chills    Otalgia, right ear 11/12/2014    Earache on right    Other conditions influencing health status     Adenocarcinoma Of The Prostate Gland    Pain in wrist 02/12/2024    Periodic breathing 06/08/2014    Periodic breathing    Personal history of other diseases of the respiratory system 01/07/2015    History of acute bronchitis    Personal history of other diseases of the respiratory system 09/17/2015    History of influenza    Strain of triceps muscle 02/12/2024    Unilateral primary osteoarthritis, left knee 10/05/2020    Primary osteoarthritis of left knee    Unspecified fall, initial encounter 01/15/2018    Accidental fall, initial encounter    Unspecified lump in unspecified breast 05/04/2020    Breast mass in male    Unspecified superficial injury of left shoulder, subsequent encounter 01/15/2018    Superficial injury of left shoulder, subsequent encounter    Urinary tract infection 02/12/2024       Past Surgical History:   Procedure Laterality Date    COLONOSCOPY  09/17/2015    Complete Colonoscopy    PROSTATECTOMY  11/06/2013     Prostatectomy Radical        reports that he has never smoked. He has never used smokeless tobacco. He reports current alcohol use. He reports that he does not use drugs.    Review of Systems  Review of Systems   Constitutional: Negative.    HENT: Negative.     Eyes: Negative.    Respiratory: Negative.     Cardiovascular: Negative.    Gastrointestinal: Negative.    Genitourinary: Negative.    Musculoskeletal:  Positive for arthralgias, gait problem and joint swelling.   Skin: Negative.    Allergic/Immunologic: Negative.    Hematological: Negative.    Psychiatric/Behavioral: Negative.     All other systems reviewed and are negative.                                 Objective    Vitals:    04/12/25 1343   BP: (!) 101/45   Pulse: 70   Resp: 16   Temp: 36.4 °C (97.6 °F)   SpO2: 98%     No LMP for male patient.    Physical Exam  Vitals reviewed.   Constitutional:       Appearance: Normal appearance. He is normal weight.   Cardiovascular:      Rate and Rhythm: Normal rate and regular rhythm.   Pulmonary:      Effort: Pulmonary effort is normal.      Breath sounds: Normal breath sounds.   Abdominal:      General: Bowel sounds are normal.   Musculoskeletal:      Left knee: Swelling and bony tenderness present. Decreased range of motion.   Skin:     General: Skin is warm and dry.   Neurological:      General: No focal deficit present.      Mental Status: He is alert and oriented to person, place, and time. Mental status is at baseline.   Psychiatric:         Mood and Affect: Mood normal.         Thought Content: Thought content normal.         Judgment: Judgment normal.         Procedures    Point of Care Test & Imaging Results from this visit  No results found for this visit on 04/12/25.   Imaging  XR knee left 3 views    Result Date: 4/12/2025  No osseous injury is evident.   MACRO: None   Signed by: Peter Knight 4/12/2025 3:21 PM Dictation workstation:   [id number]     Cardiology, Vascular, and Other Imaging  No other  imaging results found for the past 2 days      Diagnostic study results (if any) were reviewed by YARA Fuentes.    Assessment/Plan   Allergies, medications, history, and pertinent labs/EKGs/Imaging reviewed by YARA Fuentes.     Medical Decision Making  Medical Decision Making  At time of discharge patient was clinically well-appearing and HDS for outpatient management. The patient and/or family was educated regarding diagnosis, supportive care, OTC and Rx medications. The patient and/or family was given the opportunity to ask questions prior to discharge.  They verbalized understanding of my discussion of the plans for treatment, expected course, indications to return to UC or seek further evaluation in ED, and the need for timely follow up as directed.   They were provided with a work/school excuse if requested.        Orders and Diagnoses  Diagnoses and all orders for this visit:  Acute pain of left knee  -     XR knee left 3 views; Future  -     Referral to Orthopedics and Sports Medicine; Future    Supportive care, Rest ice compression  elevation    Return to Urgent care if symptoms return or progress  Follow up with PCP in 1-2 weeks     Patient disposition: Home    Electronically signed by YARA Fuentes  4:52 PM

## 2025-04-17 ENCOUNTER — OFFICE VISIT (OUTPATIENT)
Dept: SURGERY | Facility: CLINIC | Age: 75
End: 2025-04-17
Payer: MEDICARE

## 2025-04-17 VITALS
HEART RATE: 89 BPM | SYSTOLIC BLOOD PRESSURE: 135 MMHG | BODY MASS INDEX: 27.62 KG/M2 | WEIGHT: 187 LBS | DIASTOLIC BLOOD PRESSURE: 77 MMHG

## 2025-04-17 DIAGNOSIS — E04.2 MULTIPLE THYROID NODULES: ICD-10-CM

## 2025-04-17 PROCEDURE — 99214 OFFICE O/P EST MOD 30 MIN: CPT | Performed by: SURGERY

## 2025-04-17 PROCEDURE — 3075F SYST BP GE 130 - 139MM HG: CPT | Performed by: SURGERY

## 2025-04-17 PROCEDURE — 3078F DIAST BP <80 MM HG: CPT | Performed by: SURGERY

## 2025-04-17 PROCEDURE — 1159F MED LIST DOCD IN RCRD: CPT | Performed by: SURGERY

## 2025-04-17 NOTE — PROGRESS NOTES
Subjective   Patient ID: Gee Hardin is a 75 y.o. male who presents for follow-up of multinodular thyroid gland.    HPI I saw Mr. Hardin back in surgery clinic today.  I have been following him since 2021 for multinodular thyroid gland.  Back at that time we had biopsied a dominant 6.7 cm right sided thyroid nodule which came back benign.  He has never had any compressive symptoms with his nodules.  Therefore he has been on an ultrasound surveillance program.  On his most recent ultrasound completed March or 2025, his dominant right sided thyroid nodule actually decreased from 6.6 down to 5.3 cm in size.  The left sided nodule may have increased slightly 2.6 to 3.3 cm in size.  Overall radiology read is ultrasound as stable.    Changes in his neck.  No new pain or pressure no difficulty breathing or swallowing.  No troubles with his voice.    He states anxiety general medical history with his care physician all remained stable.    He remains clinically and biochemically euthyroid.  His most recent TSH with his PCP a month ago was normal at 0.83.Patient ID: Gee Hardin is a 75 y.o. male.    Review of Systems    Objective   Physical Exam  Vitals reviewed.   Constitutional:       Appearance: Normal appearance.   Neck:      Comments: He still has an enlarged right thyroid lobe compared to the left which is chronic.  Overall exam remained stable.  He does still have a little bit of tracheal deviation from right to left due to mass effect which again is not new.  Lymphadenopathy:      Cervical: No cervical adenopathy.   Neurological:      Mental Status: He is alert.         US THYROID;  3/3/2025 12:42 pm      INDICATION:  Signs/Symptoms:thyroid surveillance.      COMPARISON:  03/01/2024      ACCESSION NUMBER(S):  [id number]      ORDERING CLINICIAN:  JESÚS LEON      TECHNIQUE:  Multiple ultrasonographic images of the thyroid gland were obtained.      FINDINGS:  Heterogeneous thyroid gland.      RIGHT LOBE:  10  x 4.1 x 4.2 cm. TR 3 nodule in the upper pole measuring 5.3 x 1.6  x 3.7 cm, previously having been measured as 6.6 x 3.8 x 3.4 cm. TR 3  nodule in the upper pole measuring 3.3 x 1.7 x 2.9 cm, previously  measuring 2.6 x 1.5 x 2.2 cm.      LEFT LOBE:  6.5 x 2.6 x 2.3 cm.      ISTHMUS:  0.9 cm.      IMPRESSION:  Best efforts were made to accurately compare nodule seen on prior and  current exam, however heterogeneity limits assessment. Overall the  nodules are probably stable in size. Recommend 1 year follow-up.    Assessment/Plan    Mr. Hardin has a known history of multinodular thyroid gland dating back to 2021.  Biopsy of his dominant right sided thyroid nodule at that time was benign.    He has no new compressive symptoms in his neck and remains clinically and biochemically euthyroid.  His physical exam is also stable.    His most recent ultrasound overall was read as stable by the radiologist.  I thought the right sided nodule might be slightly smaller and the left sided nodule perhaps slightly bigger.  They felt all of this was within measurement tolerance and differences in technique.    Given a small potential change in the left sided nodule I told him I would agree with radiology to continue with an annual ultrasound.  I had originally planned to move up to a 2-year follow-up but we will keep it 1 year for now.  My nurse gave him a requisition and I will see him back next year.  If the left-sided nodule did truly continue to grow next year, we may consider a biopsy on that in the future.    He is comfortable with this plan.         Jareth Abreu MD 04/17/25 11:54 AM

## 2025-04-18 ENCOUNTER — OFFICE VISIT (OUTPATIENT)
Dept: PRIMARY CARE | Facility: CLINIC | Age: 75
End: 2025-04-18
Payer: MEDICARE

## 2025-04-18 VITALS
DIASTOLIC BLOOD PRESSURE: 65 MMHG | HEIGHT: 69 IN | RESPIRATION RATE: 22 BRPM | SYSTOLIC BLOOD PRESSURE: 100 MMHG | OXYGEN SATURATION: 98 % | HEART RATE: 53 BPM | WEIGHT: 191.8 LBS | BODY MASS INDEX: 28.41 KG/M2

## 2025-04-18 DIAGNOSIS — M51.362 DEGENERATION OF INTERVERTEBRAL DISC OF LUMBAR REGION WITH DISCOGENIC BACK PAIN AND LOWER EXTREMITY PAIN: ICD-10-CM

## 2025-04-18 DIAGNOSIS — I10 BENIGN ESSENTIAL HYPERTENSION: Primary | ICD-10-CM

## 2025-04-18 DIAGNOSIS — E78.00 ELEVATED LDL CHOLESTEROL LEVEL: ICD-10-CM

## 2025-04-18 DIAGNOSIS — C61 PROSTATIC ADENOCARCINOMA (MULTI): ICD-10-CM

## 2025-04-18 DIAGNOSIS — R21 GROIN RASH: ICD-10-CM

## 2025-04-18 DIAGNOSIS — E04.9 THYROID GOITER: ICD-10-CM

## 2025-04-18 DIAGNOSIS — M54.16 LUMBAR BACK PAIN WITH RADICULOPATHY AFFECTING RIGHT LOWER EXTREMITY: ICD-10-CM

## 2025-04-18 DIAGNOSIS — M51.16 LUMBAR DISC DISEASE WITH RADICULOPATHY: ICD-10-CM

## 2025-04-18 PROCEDURE — 1036F TOBACCO NON-USER: CPT | Performed by: INTERNAL MEDICINE

## 2025-04-18 PROCEDURE — G2211 COMPLEX E/M VISIT ADD ON: HCPCS | Performed by: INTERNAL MEDICINE

## 2025-04-18 PROCEDURE — 3074F SYST BP LT 130 MM HG: CPT | Performed by: INTERNAL MEDICINE

## 2025-04-18 PROCEDURE — 1159F MED LIST DOCD IN RCRD: CPT | Performed by: INTERNAL MEDICINE

## 2025-04-18 PROCEDURE — 3078F DIAST BP <80 MM HG: CPT | Performed by: INTERNAL MEDICINE

## 2025-04-18 PROCEDURE — 99214 OFFICE O/P EST MOD 30 MIN: CPT | Performed by: INTERNAL MEDICINE

## 2025-04-18 RX ORDER — NYSTATIN 100000 [USP'U]/G
1 POWDER TOPICAL 2 TIMES DAILY
Qty: 200 G | Refills: 1 | Status: SHIPPED | OUTPATIENT
Start: 2025-04-18

## 2025-04-18 ASSESSMENT — ENCOUNTER SYMPTOMS
NEUROLOGICAL NEGATIVE: 1
MUSCULOSKELETAL NEGATIVE: 1
PSYCHIATRIC NEGATIVE: 1
RESPIRATORY NEGATIVE: 1
ALLERGIC/IMMUNOLOGIC NEGATIVE: 1
GASTROINTESTINAL NEGATIVE: 1
CARDIOVASCULAR NEGATIVE: 1
HEMATOLOGIC/LYMPHATIC NEGATIVE: 1
EYES NEGATIVE: 1
CONSTITUTIONAL NEGATIVE: 1
ENDOCRINE NEGATIVE: 1

## 2025-04-18 NOTE — PROGRESS NOTES
"Subjective   Patient ID: Gee Hardin is a 75 y.o. male who presents for Rash (Rash near groin-used Amlactin and rash is almost gone ).    Rash         Review of Systems   Constitutional: Negative.    HENT: Negative.     Eyes: Negative.    Respiratory: Negative.     Cardiovascular: Negative.    Gastrointestinal: Negative.    Endocrine: Negative.    Musculoskeletal: Negative.    Skin:  Positive for rash.   Allergic/Immunologic: Negative.    Neurological: Negative.    Hematological: Negative.    Psychiatric/Behavioral: Negative.     All other systems reviewed and are negative.      Objective   /65   Pulse 53   Resp 22   Ht 1.753 m (5' 9\")   Wt 87 kg (191 lb 12.8 oz)   SpO2 98%   BMI 28.32 kg/m²     Physical Exam  Vitals and nursing note reviewed.   Constitutional:       Appearance: Normal appearance.   HENT:      Head: Normocephalic and atraumatic.      Right Ear: Tympanic membrane, ear canal and external ear normal.      Left Ear: Tympanic membrane, ear canal and external ear normal. There is no impacted cerumen.      Nose: Nose normal.      Mouth/Throat:      Mouth: Mucous membranes are moist.      Pharynx: Oropharynx is clear.   Eyes:      Extraocular Movements: Extraocular movements intact.      Conjunctiva/sclera: Conjunctivae normal.      Pupils: Pupils are equal, round, and reactive to light.   Cardiovascular:      Rate and Rhythm: Normal rate and regular rhythm.      Pulses: Normal pulses.      Heart sounds: Normal heart sounds. No murmur heard.  Pulmonary:      Effort: Pulmonary effort is normal. No respiratory distress.      Breath sounds: Normal breath sounds. No stridor. No wheezing, rhonchi or rales.   Chest:      Chest wall: No tenderness.   Abdominal:      General: Abdomen is flat. Bowel sounds are normal. There is no distension.      Palpations: Abdomen is soft. There is no mass.      Tenderness: There is no abdominal tenderness. There is no right CVA tenderness, left CVA tenderness, " guarding or rebound.      Hernia: No hernia is present.   Musculoskeletal:         General: Normal range of motion.      Cervical back: Normal range of motion and neck supple.   Skin:     General: Skin is warm.      Capillary Refill: Capillary refill takes less than 2 seconds.   Neurological:      General: No focal deficit present.      Mental Status: He is alert.      Cranial Nerves: No cranial nerve deficit.      Sensory: No sensory deficit.      Motor: No weakness.      Coordination: Coordination normal.      Gait: Gait normal.      Deep Tendon Reflexes: Reflexes normal.   Psychiatric:         Mood and Affect: Mood normal.         Behavior: Behavior normal. Behavior is cooperative.         Thought Content: Thought content normal.         Judgment: Judgment normal.         Assessment/Plan   Problem List Items Addressed This Visit           ICD-10-CM    Benign essential hypertension - Primary I10    HTN - hypertension well/controlled .Target BP < 130/80 achieved. Educate low salt diet and exercise with weight loss. Educate home self monitoring and diary keeping. Educate risks of elevate blood pressure and benefits of prompt treatment.    HTN - hypertension well/controlled .Target BP < 130/80 achieved. Educate low salt diet and exercise with weight loss. Educate home self monitoring and diary keeping. Educate risks of elevate blood pressure and benefits of prompt treatment.                Degenerative disc disease, lumbar M51.369    DDD - Degenerative disc disease of the Lumbo-Sacral (LS)/Cervical (C) spine. Educate exercises and referred patient to Physical Therapy (PT). Ordered X-Ray's of the LS/C spine. Consider MRI. Radiculopathy in the distribution of L4-L5-S1/C3-C4-C5 nerve roots, needs NSAIDS (Naprosin 500mg BID vs. Arthrotec 75mg BID or Prednisone taper (Medrol dose pack), Flexeril 10 mg qHS, heat application, and pain control with Tylenol          Elevated LDL cholesterol level E78.00     Hypercholesterolemia - Monitor lipid profile and educate patient upon risks of high cholesterol and targets. Educate diet and change in lifestyle and increase in exercises - Refill: Atorvastatin  and educate compliance with medication and diet.            Orders:    Comprehensive Metabolic Panel; Future    Lipid Panel; Future               Lumbar disc disease with radiculopathy M51.16    DDD - Degenerative disc disease of the Lumbo-Sacral (LS)/spine. Educate exercises and referred patient to Physical Therapy (PT). Ordered X-Ray's of the LS/ spine. Consider MRI. Radiculopathy in the distribution of L4-L5-S1/ nerve roots, needs NSAIDS (Naprosin 500mg BID vs. Arthrotec 75mg BID or Prednisone taper (Medrol dose pack), Flexeril 10 mg qHS, heat application, and pain control with Tylenol           Thyroid goiter E04.9    Thyroid goiter and follows with Dr. Brady vaughan and stable    And follow the thyroid function      Orders:    TSH with reflex to Free T4 if abnormal; Future         Lumbar back pain with radiculopathy affecting right lower extremity M54.16    DDD - Degenerative disc disease of the Lumbo-Sacral (LS)/ spine. Educate exercises and referred patient to Physical Therapy (PT). Ordered X-Ray's of the LS spine. Consider MRI. Radiculopathy in the distribution of L4-L5-S1/ nerve roots, needs NSAIDS (Naprosin 500mg BID vs. Arthrotec 75mg BID or Prednisone taper (Medrol dose pack), Flexeril 10 mg qHS, heat application, and pain control with Tylenol          Prostatic adenocarcinoma (Multi) C61    Monitor PSA and Asymptomatic   - and feels well and on Casodex                          Benign essential hypertension - Primary I10        HTN - hypertension well/controlled .Target BP < 130/80 achieved. Educate low salt diet and exercise with weight loss. Educate home self monitoring and diary keeping. Educate risks of elevate blood pressure and benefits of prompt treatment.    HTN - hypertension well/controlled .Target  BP < 130/80 achieved. Educate low salt diet and exercise with weight loss. Educate home self monitoring and diary keeping. Educate risks of elevate blood pressure and benefits of prompt treatment.               Degenerative disc disease, lumbar M51.369       DDD - Degenerative disc disease of the Lumbo-Sacral (LS)/Cervical (C) spine. Educate exercises and referred patient to Physical Therapy (PT). Ordered X-Ray's of the LS/C spine. Consider MRI. Radiculopathy in the distribution of L4-L5-S1/C3-C4-C5 nerve roots, needs NSAIDS (Naprosin 500mg BID vs. Arthrotec 75mg BID or Prednisone taper (Medrol dose pack), Flexeril 10 mg qHS, heat application, and pain control with Tylenol            Elevated LDL cholesterol level E78.00       Hypercholesterolemia - Monitor lipid profile and educate patient upon risks of high cholesterol and targets. Educate diet and change in lifestyle and increase in exercises - Refill: Atorvastatin  and educate compliance with medication and diet.            Orders:    Comprehensive Metabolic Panel; Future    Lipid Panel; Future              Lumbar back pain with radiculopathy affecting right lower extremity M54.16       DDD - Degenerative disc disease of the Lumbo-Sacral (LS)/ spine. Educate exercises and referred patient to Physical Therapy (PT). Ordered X-Ray's of the LS spine. Consider MRI. Radiculopathy in the distribution of L4-L5-S1/ nerve roots, needs NSAIDS (Naprosin 500mg BID vs. Arthrotec 75mg BID or Prednisone taper (Medrol dose pack), Flexeril 10 mg qHS, heat application, and pain control with Tylenol            Neurogenic bladder N31.9       Neurogenic Bladder. Rec. U/A and cultures and start empiric antibiotic therapy : Cipro 500 BID for 5 days and recheck urine analysis (U/A) at the end of the therapy to check effectiveness of the therapy. Treat neurogenic bladder: Hyperactive bladder - with Detrol 4 mg daily, or Vesicare 5 mg / 10 mg daily, Oxybutinin 5mg/ 10mg daily, educate  bladder training exercises(Keggel exercises) and change in lifestyle(reduce fluid intake for 4 hours before bed time).               Multiple thyroid nodules E04.2       goiter and thyroid nodule and check US and function  and follows with surgery            Prostatic adenocarcinoma (Multi) C61       Monitor PSA and Asymptomatic   - and feels well and on Casodex                            Benign essential hypertension  HTN - hypertension well/controlled .Target BP < 130/80 achieved. Educate low salt diet and exercise with weight loss. Educate home self monitoring and diary keeping. Educate risks of elevate blood pressure and benefits of prompt treatment.         Elevated LDL cholesterol level  Hypercholesterolemia - Monitor lipid profile and educate patient upon risks of high cholesterol and targets. Educate diet and change in lifestyle and increase in exercises - Refill: Atorvastatin  and educate compliance with medication and diet.            Orders:    Comprehensive Metabolic Panel; Future    Lipid Panel; Future     Thyroid goiter  Thyroid goiter and follows with Dr. Abreu surgery and stable    And follow the thyroid function      Orders:    TSH with reflex to Free T4 if abnormal; Future     Neurogenic bladder  Neurogenic Bladder. Rec. U/A and cultures and start empiric antibiotic therapy : Cipro 500 BID for 5 days and recheck urine analysis (U/A) at the end of the therapy to check effectiveness of the therapy. Treat neurogenic bladder: Hyperactive bladder - with Detrol 4 mg daily, or Vesicare 5 mg / 10 mg daily, Oxybutinin 5mg/ 10mg daily, educate bladder training exercises(Keggel exercises) and change in lifestyle(reduce fluid intake for 4 hours before bed time).         Prostatic adenocarcinoma (Multi)  Monitor PSA and Asymptomatic   - and feels well and on Casodex                Primary osteoarthritis of left knee  DJD - Degenerative Joint Disease, Chronic Arthritis, of the Left more than the right  Knee. .  Pain control, and anti-inflammatory meds : consider Kenalog injection and start Voltaren gel 1% topically BID,  and  Tylenol and Tramadol/Vicodan 5/325 mg TID PRN. Educate exercises and referred the patient to PT (Physical Therapy). Get X-Ray's.            Lumbar back pain with radiculopathy affecting right lower extremity  DDD - Degenerative disc disease of the Lumbo-Sacral (LS)/ spine. Educate exercises and referred patient to Physical Therapy (PT). Ordered X-Ray's of the LS spine. Consider MRI. Radiculopathy in the distribution of L4-L5-S1/ nerve roots, needs NSAIDS (Naprosin 500mg BID vs. Arthrotec 75mg BID or Prednisone taper (Medrol dose pack), Flexeril 10 mg qHS, heat application, and pain control with Tylenol         Hyperglycemia     Orders:    Hemoglobin A1C; Future     Megaloblastic anemia     Orders:    CBC and Auto Differential; Future              Cardiac Risk Assessment  15 - 20 minutes were spent discussing Cardiovascular risk and, if needed, lifestyle modifications were recommended, including nutritional choices, exercise, and elimination of habits contributing to risk.   Aspirin use/disuse was discussed following the guidelines below:  low dose ASA ( mg) should be considered:     If prior Heart Attack/Stroke/Peripheral vascular disease:  Generally recommend daily low dose aspirin unless extremely high bleeding risk (e.g., gastrointestinal).     If no prior Heart Attack/Stroke/Peripheral vascular disease:                                   Age over 70: Do not use Aspirin for prevention                          Age less than 70 and 10-year cardiovascular disease risk is >20%: use low dose Aspirin for prevention.                                              Benign essential hypertension I10         HTN - hypertension well/controlled .Target BP < 130/80 achieved. Educate low salt diet and exercise with weight loss. Educate home self monitoring and diary keeping. Educate risks of elevate blood  pressure and benefits of prompt treatment. Refill            Elevated LDL cholesterol level E78.00       Hypercholesterolemia - Monitor lipid profile and educate patient upon risks of high cholesterol and targets. Educate diet and change in lifestyle and increase in exercises - Refill: Atorvastatin  and educate compliance with medication and diet.                  Relevant Medications     atorvastatin (Lipitor) 10 mg tablet     Gout, arthropathy M10.9     Relevant Medications     allopurinol (Zyloprim) 100 mg tablet     Thyroid goiter E04.9       Thyroid goiter and follows with Dr. Abreu surgery and stable            Lumbar back pain with radiculopathy affecting right lower extremity - Primary M54.16       DDD - Degenerative disc disease of the Lumbo-Sacral (LS)/  spine. Educate exercises and referred patient to Physical Therapy (PT). Ordered X-Ray's of the LS spine. Consider MRI. Radiculopathy in the distribution of L4-L5-S1/ nerve roots, needs NSAIDS (Naprosin 500mg BID vs. Arthrotec 75mg BID or Prednisone taper (Medrol dose pack), Flexeril 10 mg qHS, heat application, and pain control with Tylenol            Prostatic adenocarcinoma (Multi) C61       Monitor PSA and Asymptomatic   - and feels well and on Casodex                             Benign essential hypertension - Primary I10         HTN - hypertension well/controlled .Target BP < 130/80 achieved. Educate low salt diet and exercise with weight loss. Educate home self monitoring and diary keeping. Educate risks of elevate blood pressure and benefits of prompt treatment. Refill            Degenerative disc disease, lumbar M51.36       DDD - Degenerative disc disease of the Lumbo-Sacral (LS)/Cervical (C) spine. Educate exercises and referred patient to Physical Therapy (PT). Ordered X-Ray's of the LS/C spine. Consider MRI. Radiculopathy in the distribution of L4-L5-S1/C3-C4-C5 nerve roots, needs NSAIDS (Naprosin 500mg BID vs. Arthrotec 75mg BID or  Prednisone taper (Medrol dose pack), Flexeril 10 mg qHS, heat application, and pain control with Tylenol            Elevated LDL cholesterol level E78.00       Hypercholesterolemia - Monitor lipid profile and educate patient upon risks of high cholesterol and targets. Educate diet and change in lifestyle and increase in exercises - Refill: Atorvastatin  and educate compliance with medication and diet.                  Thyroid goiter E04.9       Thyroid goiter and follows with Dr. Brady vaughan and stable            Neurogenic bladder N31.9       Neurogenic Bladder. Rec. U/A and cultures and start empiric antibiotic therapy : Cipro 500 BID for 5 days and recheck urine analysis (U/A) at the end of the therapy to check effectiveness of the therapy. Treat neurogenic bladder: Hyperactive bladder - with Detrol 4 mg daily, or Vesicare 5 mg / 10 mg daily, Oxybutinin 5mg/ 10mg daily,  educate bladder training exercises(Keggel exercises) and change in lifestyle(reduce fluid intake for 4 hours before bed time).            Prostatic adenocarcinoma (Multi) C61       Monitor PSA and Asymptomatic   - and feels well and on Casodex                                                                              Benign essential hypertension I10             HTN - hypertension well/controlled .Target BP < 130/80 achieved. Educate low salt diet and exercise with weight loss. Educate home self monitoring and diary keeping. Educate risks of elevate blood pressure and benefits of prompt treatment. Refill              Elevated LDL cholesterol level E78.00           Daniel Ortiz MD1/22/2024 3:28 PM     Hypercholesterolemia - Monitor lipid profile and educate patient upon risks of high cholesterol and targets. Educate diet and change in lifestyle and increase in exercises - Refill: Atorvastatin  and educate compliance with medication and diet.                  Lumbar disc disease with radiculopathy M51.16           DDD - Degenerative  disc disease of the Lumbo-Sacral (LS)/spine. Educate exercises and referred patient to Physical Therapy (PT). Ordered X-Ray's of the LS/ spine. Consider MRI. Radiculopathy in the distribution of L4-L5-S1/ nerve roots, needs NSAIDS (Naprosin 500mg BID vs. Arthrotec 75mg BID or Prednisone taper (Medrol dose pack), Flexeril 10 mg qHS, heat application, and pain control with Tylenol               Thyroid goiter E04.9           Thyroid goiter and follows with Dr. Abreu surgery and stable              Other chest pain - Primary R07.89           CP - Chest Pain, atypical/angina, EKG with no significant ischemic changes. Referred patient to stress test. Risk factors includes: HTN, Hypercholesterolemia, age, family history,                                                                              Control risk factors and educate diet and life style changes.                                                                                                                     Relevant Orders         Nuclear Stress Test          Bradycardia = Asymptomatic  and EKG shows no ischemia  - we will follow and get a stress test in light of his chest pain               Benign essential hypertension I10           HTN - hypertension well/controlled .Target BP < 130/80  achieved. Educate low salt diet and exercise with weight loss. Educate home self monitoring and diary keeping. Educate risks of elevate blood pressure and benefits of prompt treatment.  Refill            Relevant Orders       Comprehensive Metabolic Panel       Elevated LDL cholesterol level E78.00         Hypercholesterolemia - Monitor lipid profile and educate patient upon risks of high cholesterol and targets. Educate diet and change in lifestyle and increase in exercises - Refill: Atorvastatin  and educate compliance with medication and diet.              Relevant Orders       Lipid Panel       Lumbar disc disease with radiculopathy M51.16         DDD - Degenerative  disc disease of the Lumbo-Sacral (LS)/spine. Educate exercises and referred patient to Physical Therapy (PT). Ordered X-Ray's of the LS/ spine. Consider MRI. Radiculopathy in the distribution of L4-L5-S1/ nerve roots, needs NSAIDS (Naprosin 500mg BID vs. Arthrotec 75mg BID or Prednisone taper (Medrol dose pack), Flexeril 10 mg qHS, heat application, and pain control with Tylenol             Neurogenic bladder N31.9       Multiple thyroid nodules E04.2         .goiter and thyroid nodule and check US and function  and follows with surgery            Relevant Orders       TSH with reflex to Free T4 if abnormal                               Immunizations/Injections       very important  Immunizations from outside sources need reconciliation.       Influenza, High Dose Seasonal, Preservative Free10/10/2022, 10/8/2021, 10/5/2020,  ... (5 more)  Influenza, Seasonal, Quadrivalent, Adjuvanted8/31/2023  Influenza, Vcjomzqkqve24/10/2022, 10/8/2021, 11/19/2010  Influenza, seasonal, ptiopntzkw83/4/2013, 10/30/2012  Moderna COVID-19 vaccine, Fall 2023, 12 yeasrs and older (50mcg/0.5mL)9/30/2023  Moderna COVID-19 vaccine, bivalent, blue cap/gray label *Check age/dose*9/18/2022  Moderna SARS-CoV-2 Vaccination2/12/2022, 8/15/2021, 3/4/2021,  ... (1 more)  Pneumococcal conjugate vaccine, 13-valent (PREVNAR 13)9/17/2015  Pneumococcal conjugate vaccine, 20-valent (PREVNAR 20)8/31/2023  Pneumococcal polysaccharide vaccine, 23-valent, age 2 years and older (PNEUMOVAX 23)5/9/2021, 12/22/2014  Td vaccine, age 7 years and older (TDVAX)8/21/2009  Zoster vaccine, recombinant, adult (SHINGRIX)5/9/2021, 3/5/2019  Zoster, live9/14/2011                     Immunizations/Injections       very important  Immunizations from outside sources need reconciliation.       Influenza, High Dose Seasonal, Preservative Free10/10/2022, 10/8/2021, 10/5/2020,  ... (5 more)  Influenza, Seasonal, Quadrivalent, Adjuvanted8/31/2023  Influenza, Gbhyryssfqg47/10/2022,  10/8/2021, 11/19/2010  Influenza, seasonal, ygceullvab99/4/2013, 10/30/2012  Moderna COVID-19 vaccine, Fall 2023, 12 yeasrs and older (50mcg/0.5mL)9/30/2023  Moderna COVID-19 vaccine, bivalent, blue cap/gray label *Check age/dose*9/18/2022  Moderna SARS-CoV-2 Vaccination2/12/2022, 8/15/2021, 3/4/2021,  ... (1 more)  Pneumococcal conjugate vaccine, 13-valent (PREVNAR 13)9/17/2015  Pneumococcal conjugate vaccine, 20-valent (PREVNAR 20)8/31/2023  Pneumococcal polysaccharide vaccine, 23-valent, age 2 years and older (PNEUMOVAX 23)5/9/2021, 12/22/2014  Td vaccine, age 7 years and older (TDVAX)8/21/2009  Zoster vaccine, recombinant, adult (SHINGRIX)5/9/2021, 3/5/2019  Zoster, live9/14/2011                  Immunizations/Injections       very important  Immunizations from outside sources need reconciliation.       Influenza, High Dose Seasonal, Preservative Free10/10/2022, 10/8/2021, 10/5/2020,  ... (5 more)  Influenza, Seasonal, Quadrivalent, Adjuvanted8/31/2023  Influenza, Jjcohelhhzf28/10/2022, 10/8/2021, 11/19/2010  Influenza, seasonal, iezrgdptpj12/4/2013, 10/30/2012  Moderna COVID-19 vaccine, Fall 2023, 12 yeasrs and older (50mcg/0.5mL)9/30/2023  Moderna COVID-19 vaccine, bivalent, blue cap/gray label *Check age/dose*9/18/2022  Moderna SARS-CoV-2 Vaccination2/12/2022, 8/15/2021, 3/4/2021,  ... (1 more)  Pneumococcal conjugate vaccine, 13-valent (PREVNAR 13)9/17/2015  Pneumococcal conjugate vaccine, 20-valent (PREVNAR 20)8/31/2023  Pneumococcal polysaccharide vaccine, 23-valent, age 2 years and older (PNEUMOVAX 23)5/9/2021, 12/22/2014  Td vaccine, age 7 years and older (TDVAX)8/21/2009  Zoster vaccine, recombinant, adult (SHINGRIX)5/9/2021, 3/5/2019  Zoster, live9/14/2011                  Immunizations/Injections       very important  Immunizations from outside sources need reconciliation.       Flu vaccine, trivalent, preservative free, HIGH-DOSE, age 65y+ (Fluzone)10/10/2022, 10/8/2021, 10/5/2020,  ... (5  more)  Influenza, Seasonal, Quadrivalent, Adjuvanted8/31/2023  Influenza, Qmfzgzvhfua46/10/2022, 10/8/2021, 11/19/2010  Influenza, seasonal, jhxpjlepwr53/4/2013, 10/30/2012  Moderna COVID-19 vaccine, 12 years and older (50mcg/0.5mL)(Spikevax)9/30/2023  Moderna COVID-19 vaccine, bivalent, blue cap/gray label *Check age/dose*9/18/2022  Moderna SARS-CoV-2 Vaccination2/12/2022, 8/15/2021, 3/4/2021,  ... (1 more)  Pneumococcal conjugate vaccine, 13-valent (PREVNAR 13)9/17/2015  Pneumococcal conjugate vaccine, 20-valent (PREVNAR 20)8/31/2023  Pneumococcal polysaccharide vaccine, 23-valent, age 2 years and older (PNEUMOVAX 23)5/9/2021, 12/22/2014  Td vaccine, age 7 years and older (TDVAX)8/21/2009  Zoster vaccine, recombinant, adult (SHINGRIX)5/9/2021, 3/5/2019  Zoster, live9/14/2011               Immunizations/Injections       very important  Immunizations from outside sources need reconciliation.       Flu vaccine, trivalent, preservative free, HIGH-DOSE, age 65y+ (Fluzone)10/10/2022, 10/8/2021, 10/5/2020,  ... (5 more)  Influenza, Seasonal, Quadrivalent, Adjuvanted8/31/2023  Influenza, Rmoftlijgey92/10/2022, 10/8/2021, 11/19/2010  Influenza, seasonal, nhrbwaegoz85/4/2013, 10/30/2012  Moderna COVID-19 vaccine, 12 years and older (50mcg/0.5mL)(Spikevax)9/30/2023  Moderna COVID-19 vaccine, bivalent, blue cap/gray label *Check age/dose*9/18/2022  Moderna SARS-CoV-2 Vaccination2/12/2022, 8/15/2021, 3/4/2021,  ... (1 more)  Pneumococcal conjugate vaccine, 13-valent (PREVNAR 13)9/17/2015  Pneumococcal conjugate vaccine, 20-valent (PREVNAR 20)8/31/2023  Pneumococcal polysaccharide vaccine, 23-valent, age 2 years and older (PNEUMOVAX 23)5/9/2021, 12/22/2014  Td vaccine, age 7 years and older (TDVAX)8/21/2009  Zoster vaccine, recombinant, adult (SHINGRIX)5/9/2021, 3/5/2019

## 2025-06-23 ENCOUNTER — APPOINTMENT (OUTPATIENT)
Dept: PRIMARY CARE | Facility: CLINIC | Age: 75
End: 2025-06-23
Payer: MEDICARE

## 2025-07-04 DIAGNOSIS — M10.9 GOUT, ARTHROPATHY: ICD-10-CM

## 2025-07-07 RX ORDER — ALLOPURINOL 100 MG/1
100 TABLET ORAL DAILY
Qty: 90 TABLET | Refills: 0 | Status: SHIPPED | OUTPATIENT
Start: 2025-07-07

## 2025-08-14 ENCOUNTER — APPOINTMENT (OUTPATIENT)
Dept: PRIMARY CARE | Facility: CLINIC | Age: 75
End: 2025-08-14
Payer: MEDICARE

## 2025-08-14 VITALS
DIASTOLIC BLOOD PRESSURE: 64 MMHG | OXYGEN SATURATION: 97 % | WEIGHT: 190 LBS | HEIGHT: 69 IN | SYSTOLIC BLOOD PRESSURE: 128 MMHG | HEART RATE: 57 BPM | BODY MASS INDEX: 28.14 KG/M2

## 2025-08-14 DIAGNOSIS — Z00.00 MEDICARE ANNUAL WELLNESS VISIT, SUBSEQUENT: ICD-10-CM

## 2025-08-14 DIAGNOSIS — E78.2 MIXED HYPERLIPIDEMIA: ICD-10-CM

## 2025-08-14 DIAGNOSIS — Z00.00 ROUTINE GENERAL MEDICAL EXAMINATION AT HEALTH CARE FACILITY: Primary | ICD-10-CM

## 2025-08-14 ASSESSMENT — ENCOUNTER SYMPTOMS
OCCASIONAL FEELINGS OF UNSTEADINESS: 0
DEPRESSION: 0
LOSS OF SENSATION IN FEET: 0

## 2025-08-14 ASSESSMENT — PATIENT HEALTH QUESTIONNAIRE - PHQ9
SUM OF ALL RESPONSES TO PHQ9 QUESTIONS 1 AND 2: 0
2. FEELING DOWN, DEPRESSED OR HOPELESS: NOT AT ALL
1. LITTLE INTEREST OR PLEASURE IN DOING THINGS: NOT AT ALL

## 2025-09-26 ENCOUNTER — APPOINTMENT (OUTPATIENT)
Dept: PRIMARY CARE | Facility: CLINIC | Age: 75
End: 2025-09-26
Payer: MEDICARE

## 2025-11-14 ENCOUNTER — APPOINTMENT (OUTPATIENT)
Dept: PRIMARY CARE | Facility: CLINIC | Age: 75
End: 2025-11-14
Payer: MEDICARE

## 2025-12-17 ENCOUNTER — APPOINTMENT (OUTPATIENT)
Dept: PRIMARY CARE | Facility: CLINIC | Age: 75
End: 2025-12-17
Payer: MEDICARE